# Patient Record
Sex: MALE | URBAN - METROPOLITAN AREA
[De-identification: names, ages, dates, MRNs, and addresses within clinical notes are randomized per-mention and may not be internally consistent; named-entity substitution may affect disease eponyms.]

---

## 2021-01-06 ENCOUNTER — INPATIENT (INPATIENT)
Facility: HOSPITAL | Age: 56
LOS: 2 days | Discharge: HOME | DRG: 392 | End: 2021-01-09
Attending: INTERNAL MEDICINE | Admitting: HOSPITALIST
Payer: MEDICAID

## 2021-01-06 VITALS
RESPIRATION RATE: 18 BRPM | SYSTOLIC BLOOD PRESSURE: 112 MMHG | TEMPERATURE: 98 F | HEART RATE: 74 BPM | DIASTOLIC BLOOD PRESSURE: 64 MMHG | WEIGHT: 231.49 LBS | OXYGEN SATURATION: 94 %

## 2021-01-06 LAB
ALBUMIN SERPL ELPH-MCNC: 4.7 G/DL — SIGNIFICANT CHANGE UP (ref 3.3–5)
ALP SERPL-CCNC: 42 U/L — SIGNIFICANT CHANGE UP (ref 40–120)
ALT FLD-CCNC: 18 U/L — SIGNIFICANT CHANGE UP (ref 10–45)
ANION GAP SERPL CALC-SCNC: 13 MMOL/L — SIGNIFICANT CHANGE UP (ref 5–17)
AST SERPL-CCNC: 13 U/L — SIGNIFICANT CHANGE UP (ref 10–40)
BASE EXCESS BLDV CALC-SCNC: 1 MMOL/L — SIGNIFICANT CHANGE UP (ref -2–2)
BASOPHILS # BLD AUTO: 0.1 K/UL — SIGNIFICANT CHANGE UP (ref 0–0.2)
BASOPHILS NFR BLD AUTO: 0.7 % — SIGNIFICANT CHANGE UP (ref 0–2)
BILIRUB SERPL-MCNC: 0.7 MG/DL — SIGNIFICANT CHANGE UP (ref 0.2–1.2)
BUN SERPL-MCNC: 16 MG/DL — SIGNIFICANT CHANGE UP (ref 7–23)
CA-I SERPL-SCNC: 1.15 MMOL/L — SIGNIFICANT CHANGE UP (ref 1.12–1.3)
CALCIUM SERPL-MCNC: 9.9 MG/DL — SIGNIFICANT CHANGE UP (ref 8.4–10.5)
CHLORIDE BLDV-SCNC: 107 MMOL/L — SIGNIFICANT CHANGE UP (ref 96–108)
CHLORIDE SERPL-SCNC: 99 MMOL/L — SIGNIFICANT CHANGE UP (ref 96–108)
CO2 BLDV-SCNC: 29 MMOL/L — SIGNIFICANT CHANGE UP (ref 22–30)
CO2 SERPL-SCNC: 24 MMOL/L — SIGNIFICANT CHANGE UP (ref 22–31)
CREAT SERPL-MCNC: 0.97 MG/DL — SIGNIFICANT CHANGE UP (ref 0.5–1.3)
EOSINOPHIL # BLD AUTO: 1.04 K/UL — HIGH (ref 0–0.5)
EOSINOPHIL NFR BLD AUTO: 7.3 % — HIGH (ref 0–6)
GAS PNL BLDV: 131 MMOL/L — LOW (ref 135–145)
GAS PNL BLDV: SIGNIFICANT CHANGE UP
GAS PNL BLDV: SIGNIFICANT CHANGE UP
GLUCOSE BLDV-MCNC: 130 MG/DL — HIGH (ref 70–99)
GLUCOSE SERPL-MCNC: 128 MG/DL — HIGH (ref 70–99)
HCO3 BLDV-SCNC: 27 MMOL/L — SIGNIFICANT CHANGE UP (ref 21–29)
HCT VFR BLD CALC: 48.9 % — SIGNIFICANT CHANGE UP (ref 39–50)
HCT VFR BLDA CALC: 50 % — SIGNIFICANT CHANGE UP (ref 39–50)
HGB BLD CALC-MCNC: 16.3 G/DL — SIGNIFICANT CHANGE UP (ref 13–17)
HGB BLD-MCNC: 15.7 G/DL — SIGNIFICANT CHANGE UP (ref 13–17)
IMM GRANULOCYTES NFR BLD AUTO: 0.3 % — SIGNIFICANT CHANGE UP (ref 0–1.5)
LACTATE BLDV-MCNC: 1.1 MMOL/L — SIGNIFICANT CHANGE UP (ref 0.7–2)
LIDOCAIN IGE QN: 168 U/L — HIGH (ref 7–60)
LYMPHOCYTES # BLD AUTO: 24.2 % — SIGNIFICANT CHANGE UP (ref 13–44)
LYMPHOCYTES # BLD AUTO: 3.44 K/UL — HIGH (ref 1–3.3)
MCHC RBC-ENTMCNC: 27.7 PG — SIGNIFICANT CHANGE UP (ref 27–34)
MCHC RBC-ENTMCNC: 32.1 GM/DL — SIGNIFICANT CHANGE UP (ref 32–36)
MCV RBC AUTO: 86.4 FL — SIGNIFICANT CHANGE UP (ref 80–100)
MONOCYTES # BLD AUTO: 0.71 K/UL — SIGNIFICANT CHANGE UP (ref 0–0.9)
MONOCYTES NFR BLD AUTO: 5 % — SIGNIFICANT CHANGE UP (ref 2–14)
NEUTROPHILS # BLD AUTO: 8.87 K/UL — HIGH (ref 1.8–7.4)
NEUTROPHILS NFR BLD AUTO: 62.5 % — SIGNIFICANT CHANGE UP (ref 43–77)
NRBC # BLD: 0 /100 WBCS — SIGNIFICANT CHANGE UP (ref 0–0)
PCO2 BLDV: 52 MMHG — HIGH (ref 35–50)
PH BLDV: 7.34 — LOW (ref 7.35–7.45)
PLATELET # BLD AUTO: 247 K/UL — SIGNIFICANT CHANGE UP (ref 150–400)
PO2 BLDV: 20 MMHG — LOW (ref 25–45)
POTASSIUM BLDV-SCNC: 3.5 MMOL/L — SIGNIFICANT CHANGE UP (ref 3.5–5.3)
POTASSIUM SERPL-MCNC: 4.1 MMOL/L — SIGNIFICANT CHANGE UP (ref 3.5–5.3)
POTASSIUM SERPL-SCNC: 4.1 MMOL/L — SIGNIFICANT CHANGE UP (ref 3.5–5.3)
PROT SERPL-MCNC: 7.1 G/DL — SIGNIFICANT CHANGE UP (ref 6–8.3)
RBC # BLD: 5.66 M/UL — SIGNIFICANT CHANGE UP (ref 4.2–5.8)
RBC # FLD: 12.6 % — SIGNIFICANT CHANGE UP (ref 10.3–14.5)
SAO2 % BLDV: 27 % — LOW (ref 67–88)
SODIUM SERPL-SCNC: 136 MMOL/L — SIGNIFICANT CHANGE UP (ref 135–145)
TROPONIN T, HIGH SENSITIVITY RESULT: <6 NG/L — SIGNIFICANT CHANGE UP (ref 0–51)
WBC # BLD: 14.2 K/UL — HIGH (ref 3.8–10.5)
WBC # FLD AUTO: 14.2 K/UL — HIGH (ref 3.8–10.5)

## 2021-01-06 PROCEDURE — 99285 EMERGENCY DEPT VISIT HI MDM: CPT

## 2021-01-06 PROCEDURE — 71275 CT ANGIOGRAPHY CHEST: CPT | Mod: 26

## 2021-01-06 PROCEDURE — 93010 ELECTROCARDIOGRAM REPORT: CPT

## 2021-01-06 PROCEDURE — 74174 CTA ABD&PLVS W/CONTRAST: CPT | Mod: 26

## 2021-01-06 RX ORDER — MORPHINE SULFATE 50 MG/1
4 CAPSULE, EXTENDED RELEASE ORAL ONCE
Refills: 0 | Status: DISCONTINUED | OUTPATIENT
Start: 2021-01-06 | End: 2021-01-06

## 2021-01-06 RX ORDER — SUCRALFATE 1 G
1 TABLET ORAL ONCE
Refills: 0 | Status: COMPLETED | OUTPATIENT
Start: 2021-01-06 | End: 2021-01-06

## 2021-01-06 RX ADMIN — Medication 1 GRAM(S): at 22:43

## 2021-01-06 RX ADMIN — Medication 30 MILLILITER(S): at 22:43

## 2021-01-06 RX ADMIN — MORPHINE SULFATE 4 MILLIGRAM(S): 50 CAPSULE, EXTENDED RELEASE ORAL at 22:14

## 2021-01-06 NOTE — ED PROVIDER NOTE - ATTENDING CONTRIBUTION TO CARE
Attending MD Gomez: I personally have seen and examined this patient.  Resident note reviewed and agree on plan of care and except where noted.  See below for details.     Seen in [facility]    55M with no reported PMH/PSH/Meds/Allergies presents to the ED with epigastric pain    TO BE COMPLETED Attending MD Gomez: I personally have seen and examined this patient.  Resident note reviewed and agree on plan of care and except where noted.  See below for details.     Patient brought in after being evaluated by [facility] with epigastric pain radiating to back, diaphoresis and severe pain.  Patient brought emergently to CT scanner for concern for aortic dissection.  No gross dissection seen while patient on scanner.  Rads called to request a prompt review and read.  Will await.     Seen in [facility],  [place], [id number]     55M with no reported PMH/PSH/Meds/Allergies presents to the ED with epigastric pain.  Reports pain started about 2 hrs after returning from work.  Reports has not had a meal today, reports had a [employer] after work.  Reports pain is in the epigastrium, reports radiation to lower abdomen.  Reports constant, severe pain.  Denies emesis, diarrhea, black or bloody stools.  Denies dysuria, hematuria, change in urinary habits including frequency, urgency. Denies testicular pain.  Reports chest pain. Denies shortness of breath.  Denies cough, COVID/COVID contacts, sick contacts.  Denies fevers, chills, dizziness, weakness.  Denies previous episode. A ten (10) point review of systems was negative other than as stated in the HPI or elsewhere in the chart.     Exam:   General: +uncomfortable, diaphoretic, once back in the room after CT scan  HENT: head NCAT, airway patent with moist mucous membranes  Eyes: PERRL  Lungs: lungs CTAB with good inspiratory effort, no wheezing, no rhonchi, no rales  Cardiac: +S1S2, no m/r/g  GI: abdomen soft with +BS, +epigastric and RUQ tenderness, no rebound, no guarding, ND  : no CVAT  MSK: FROM at neck, no calf tenderness, swelling, erythema or warmth  Neuro: moving all extremities with 5/5 strength bilateral upper and lower extremities, sensory grossly intact, no gross neuro deficits  Psych: normal mood and affect   Skin: no rash noted    A/P: 55M with epigastric pain, CTA C/A/P performed for possible aortic dissection, awaiting prelim read, DDx includes pancreatitis, cholecystitis, choledocholithiasis, gastritis, will give pain control, labs, GI cocktail, otherwise npo, CTA official read, reassess Attending MD Gomez: I personally have seen and examined this patient.  Resident note reviewed and agree on plan of care and except where noted.  See below for details.     Patient brought in after being evaluated by [facility] with epigastric pain radiating to back, diaphoresis and severe pain.  Patient brought emergently to CT scanner for concern for aortic dissection.  No gross dissection seen while patient on scanner.  Rads called to request a prompt review and read.  Will await.     Seen in [facility],  [place], [id number]     55M with no reported PMH/PSH/Meds/Allergies (reports has not seen a doctor in at least three years) presents to the ED with epigastric pain.  Reports pain started about 2 hrs after returning from work.  Reports has not had a meal today, reports had a [employer] after work.  Reports pain is in the epigastrium, reports radiation to lower abdomen.  Reports constant, severe pain.  Denies emesis, diarrhea, black or bloody stools.  Denies dysuria, hematuria, change in urinary habits including frequency, urgency. Denies testicular pain.  Reports chest pain. Denies shortness of breath.  Denies cough, COVID/COVID contacts, sick contacts.  Denies fevers, chills, dizziness, weakness.  Denies previous episode. A ten (10) point review of systems was negative other than as stated in the HPI or elsewhere in the chart.     Exam:   General: +uncomfortable, diaphoretic, once back in the room after CT scan  HENT: head NCAT, airway patent with moist mucous membranes  Eyes: PERRL  Lungs: lungs CTAB with good inspiratory effort, no wheezing, no rhonchi, no rales  Cardiac: +S1S2, no m/r/g  GI: abdomen soft with +BS, +epigastric and RUQ tenderness, no rebound, no guarding, ND  : no CVAT  MSK: FROM at neck, no calf tenderness, swelling, erythema or warmth  Neuro: moving all extremities with 5/5 strength bilateral upper and lower extremities, sensory grossly intact, no gross neuro deficits  Psych: normal mood and affect   Skin: no rash noted    A/P: 55M with epigastric pain, CTA C/A/P performed for possible aortic dissection, awaiting prelim read, DDx includes pancreatitis, cholecystitis, choledocholithiasis, gastritis, will give pain control, labs, GI cocktail, otherwise npo, CTA official read, reassess

## 2021-01-06 NOTE — ED ADULT NURSE NOTE - CHIEF COMPLAINT QUOTE
Mid epigastric abdominal pain radiating downwards x 1 hour, diaphoretic in triage. No medical hx or allergies, has not seen doctor in 3 years.

## 2021-01-06 NOTE — ED PROVIDER NOTE - PHYSICAL EXAMINATION
Physical Exam:  General: In obvious pain  Eyes: EOMI, Conjunctia and sclera clear  Neck: No JVD  Lungs: Clear to auscultation bilaterally, no wheeze, no rhonchi  Heart: Normal S1, S2, no murmurs  Abdomen: mildly distended, tender to palpation over epigastric, RUQ,  Soft, nontender, nondistended, no CVA tenderness  Extremities: 2+ peripheral pulses, no edema  Psych: AAO X3  Neurologic: Non-focal, moving X4 extremities, no change in sensation

## 2021-01-06 NOTE — ED PROVIDER NOTE - OBJECTIVE STATEMENT
55 Y M no PMH, no PMD in years presenting with acute onset epigastric pain earlier today. States after returning from work ~2 hours prior to arrival experienced acute onset epigastric pain, associated with belching, diaphoresis, radiating to lower abdomen and groin, worse with inspiration, no PO since this morning. + smoking, no EtOH, no sympathomimetic, methamphetamine or other recreational drug use, no Fhx cardiac mortality, Denies any emesis, back pain, fever, hematuria, dysuria, diarrhea.

## 2021-01-06 NOTE — ED ADULT NURSE NOTE - NSIMPLEMENTINTERV_GEN_ALL_ED
Implemented All Universal Safety Interventions:  [place] to call system. Call bell, personal items and telephone within reach. Instruct patient to call for assistance. Room bathroom lighting operational. Non-slip footwear when patient is off stretcher. Physically safe environment: no spills, clutter or unnecessary equipment. Stretcher in lowest position, wheels locked, appropriate side rails in place.

## 2021-01-06 NOTE — ED ADULT TRIAGE NOTE - CHIEF COMPLAINT QUOTE
abdominal pain, no radiation. No medical hx of allergies according to son. Mid epigastric abdominal pain radiating downwards x 1 hour, diaphoretic in triage. No medical hx or allergies, has not seen doctor in 3 years.

## 2021-01-06 NOTE — ED ADULT NURSE NOTE - OBJECTIVE STATEMENT
2211 pt 55ym aox4 Czech spkg, c/o epigastric pain x 1hr today, able to verbalize concerns, pt sent to ct/labs/test pending

## 2021-01-06 NOTE — ED PROVIDER NOTE - NS ED ROS FT
GENERAL: No fever or chills, + diaphoresis  EYES: No change in vision  HEENT: No trouble swallowing or speaking  CARDIAC: + chest pain  PULMONARY: No cough or SOB  GI: + abdominal pain, no nausea or no vomiting, no diarrhea or constipation, + belching  : No changes in urination  SKIN: No rashes  NEURO: No headache, no numbness, no new weakness or change in sensation of B/L upper and lower extremities  MSK: No joint pain  Otherwise as HPI or negative.

## 2021-01-06 NOTE — ED PROVIDER NOTE - CLINICAL SUMMARY MEDICAL DECISION MAKING FREE TEXT BOX
55 Y M no PMD, no PMH presenting with acute onset upper abdominal pain with radiation to lower abdomen. Concern for aortic dissection in setting of acute onset abdominal pain, Cholecystitis, pancreatitis, gastritis. Common labs, CTAP, RUQ ultrasound, Pain control.

## 2021-01-06 NOTE — ED PROVIDER NOTE - RAPID ASSESSMENT
55 y.o M with no known PMHx presents with 8/10 epigastric pain radiating down his groin x1 hour. +diaphoresis, and nausea. Denies emesis, back pain, fever, COVID exposure, or hematuria. Of note, pt has not seen PMD for past 3 years.     Scribe Statement: I, Piedad Orellana, attest that this documentation has been prepared under the direction and in the presence of Hernesto Kohler)   Hernesto Kohler (MD) Patient was rapidly assessed via a telemedicine and/or role of Quick Triage Doctor; a limited history, physical exam and assessment was performed. The patient will be seen and further evaluated in the main emergency department. The remainder of care and evaluation will be conducted by the primary emergency medicine team. Receiving team will follow up on labs, imaging and serially reassess patient as indicated. All further decisions regarding patient care, evaluation and disposition are at the discretion of the receiving primary emergency department team.   Hernesto Kohler) I personally performed the service described in the documentation recorded by the scribe in my presence, and it accurately and completely records my words and actions. 55 y.o M with no known PMHx presents with 8/10 epigastric pain radiating down his groin x1 hour. +diaphoresis, and nausea. Denies emesis, back pain, fever, COVID exposure, or hematuria. Of note, pt has not seen PMD for past 3 years.  CTA- C/A/P ordered to eval for aortic catastrophe and pt moved to main ED.  S/O given to primary team.  --BMM    Scribe Statement: I, Piedad Orellana, attest that this documentation has been prepared under the direction and in the presence of Hernesto Kohler)   Hernesto Kohler (MD) Patient was rapidly assessed via a telemedicine and/or role of Quick Triage Doctor; a limited history, physical exam and assessment was performed. The patient will be seen and further evaluated in the main emergency department. The remainder of care and evaluation will be conducted by the primary emergency medicine team. Receiving team will follow up on labs, imaging and serially reassess patient as indicated. All further decisions regarding patient care, evaluation and disposition are at the discretion of the receiving primary emergency department team.   Hernesto Kohler (MD) I personally performed the service described in the documentation recorded by the scribe in my presence, and it accurately and completely records my words and actions.

## 2021-01-07 DIAGNOSIS — R10.9 UNSPECIFIED ABDOMINAL PAIN: ICD-10-CM

## 2021-01-07 DIAGNOSIS — R74.8 ABNORMAL LEVELS OF OTHER SERUM ENZYMES: ICD-10-CM

## 2021-01-07 DIAGNOSIS — D72.829 ELEVATED WHITE BLOOD CELL COUNT, UNSPECIFIED: ICD-10-CM

## 2021-01-07 DIAGNOSIS — Z29.9 ENCOUNTER FOR PROPHYLACTIC MEASURES, UNSPECIFIED: ICD-10-CM

## 2021-01-07 LAB — SARS-COV-2 RNA SPEC QL NAA+PROBE: SIGNIFICANT CHANGE UP

## 2021-01-07 PROCEDURE — 99223 1ST HOSP IP/OBS HIGH 75: CPT | Mod: 25

## 2021-01-07 PROCEDURE — 88305 TISSUE EXAM BY PATHOLOGIST: CPT | Mod: 26

## 2021-01-07 PROCEDURE — 88312 SPECIAL STAINS GROUP 1: CPT | Mod: 26

## 2021-01-07 PROCEDURE — 99223 1ST HOSP IP/OBS HIGH 75: CPT | Mod: GC

## 2021-01-07 PROCEDURE — 43239 EGD BIOPSY SINGLE/MULTIPLE: CPT

## 2021-01-07 PROCEDURE — 76700 US EXAM ABDOM COMPLETE: CPT | Mod: 26

## 2021-01-07 RX ORDER — SODIUM CHLORIDE 9 MG/ML
1000 INJECTION INTRAMUSCULAR; INTRAVENOUS; SUBCUTANEOUS
Refills: 0 | Status: DISCONTINUED | OUTPATIENT
Start: 2021-01-07 | End: 2021-01-09

## 2021-01-07 RX ORDER — ACETAMINOPHEN 500 MG
650 TABLET ORAL EVERY 6 HOURS
Refills: 0 | Status: DISCONTINUED | OUTPATIENT
Start: 2021-01-07 | End: 2021-01-08

## 2021-01-07 RX ORDER — PANTOPRAZOLE SODIUM 20 MG/1
40 TABLET, DELAYED RELEASE ORAL
Refills: 0 | Status: DISCONTINUED | OUTPATIENT
Start: 2021-01-07 | End: 2021-01-09

## 2021-01-07 RX ORDER — SODIUM CHLORIDE 9 MG/ML
1000 INJECTION, SOLUTION INTRAVENOUS ONCE
Refills: 0 | Status: COMPLETED | OUTPATIENT
Start: 2021-01-07 | End: 2021-01-07

## 2021-01-07 RX ORDER — MORPHINE SULFATE 50 MG/1
4 CAPSULE, EXTENDED RELEASE ORAL ONCE
Refills: 0 | Status: DISCONTINUED | OUTPATIENT
Start: 2021-01-07 | End: 2021-01-07

## 2021-01-07 RX ORDER — SUCRALFATE 1 G
1 TABLET ORAL
Refills: 0 | Status: DISCONTINUED | OUTPATIENT
Start: 2021-01-07 | End: 2021-01-09

## 2021-01-07 RX ORDER — MORPHINE SULFATE 50 MG/1
2 CAPSULE, EXTENDED RELEASE ORAL EVERY 6 HOURS
Refills: 0 | Status: DISCONTINUED | OUTPATIENT
Start: 2021-01-07 | End: 2021-01-08

## 2021-01-07 RX ADMIN — SODIUM CHLORIDE 30 MILLILITER(S): 9 INJECTION INTRAMUSCULAR; INTRAVENOUS; SUBCUTANEOUS at 23:38

## 2021-01-07 RX ADMIN — SODIUM CHLORIDE 1000 MILLILITER(S): 9 INJECTION, SOLUTION INTRAVENOUS at 05:36

## 2021-01-07 RX ADMIN — MORPHINE SULFATE 2 MILLIGRAM(S): 50 CAPSULE, EXTENDED RELEASE ORAL at 23:40

## 2021-01-07 RX ADMIN — SODIUM CHLORIDE 30 MILLILITER(S): 9 INJECTION INTRAMUSCULAR; INTRAVENOUS; SUBCUTANEOUS at 17:24

## 2021-01-07 RX ADMIN — MORPHINE SULFATE 2 MILLIGRAM(S): 50 CAPSULE, EXTENDED RELEASE ORAL at 17:24

## 2021-01-07 RX ADMIN — Medication 1 GRAM(S): at 18:15

## 2021-01-07 RX ADMIN — MORPHINE SULFATE 4 MILLIGRAM(S): 50 CAPSULE, EXTENDED RELEASE ORAL at 02:33

## 2021-01-07 RX ADMIN — PANTOPRAZOLE SODIUM 40 MILLIGRAM(S): 20 TABLET, DELAYED RELEASE ORAL at 17:24

## 2021-01-07 RX ADMIN — Medication 1 GRAM(S): at 23:38

## 2021-01-07 RX ADMIN — MORPHINE SULFATE 4 MILLIGRAM(S): 50 CAPSULE, EXTENDED RELEASE ORAL at 00:34

## 2021-01-07 NOTE — CONSULT NOTE ADULT - PROBLEM SELECTOR RECOMMENDATION 9
Pt p/w acute 10/10 epigastric pain. Also reporting increased acid reflux. Found to have elevated WBC to 14.20, w/ eosinophilia and elevated lipase. Imaging findings showing gastritis w/ reactive gallbladder wall thickening and cholelithiasis on abdominal U/S, though negative sonographic Cameron's. Pt w/ significant risk factors including smoking history and excessive consumption of coffee. At this point, differential diagnosis is broad and includes, but is not limited to, peptic ulcer disease, gastritis, possibly related to underlying infection, and neoplasm.  - Cont w/ protonix 40 mg tablets BID  - Pt would benefit from EGD. D/w him, and he is in agreement. Added to the schedule for today. Pt p/w acute 10/10 epigastric pain. Also reporting increased acid reflux. Found to have elevated WBC to 14.20, w/ eosinophilia and elevated lipase. Imaging findings showing gastritis w/ reactive gallbladder wall thickening and cholelithiasis on abdominal U/S, though negative sonographic Cameron's. Pt w/ significant risk factors including smoking history and excessive consumption of coffee. At this point, differential diagnosis is broad and includes, but is not limited to, peptic ulcer disease, gastritis, possibly related to underlying infection, and neoplasm.  - Cont w/ protonix 40 mg tablets BID  - C/w PRN maalox for dyspepsia  - Pt would benefit from EGD. D/w him, and he is in agreement. Added to the schedule for today. Pt p/w acute 10/10 epigastric pain. Also reporting increased acid reflux. Found to have elevated WBC to 14.20, w/ eosinophilia and elevated lipase. Imaging findings showing gastritis w/ reactive gallbladder wall thickening and cholelithiasis on abdominal U/S, though negative sonographic Cameron's. Pt w/ significant risk factors including smoking history and excessive consumption of coffee. At this point, differential diagnosis is broad and includes, but is not limited to, peptic ulcer disease, gastritis, possibly related to underlying infection, and neoplasm.  - Cont w/ protonix 40 mg tablets BID  - C/w PRN maalox for dyspepsia  - Pt would benefit from EGD. D/w him, and he is in agreement. Added to the schedule for today.  - Maintain NPO until after scope.  - Pain management per primary team.

## 2021-01-07 NOTE — CONSULT NOTE ADULT - SUBJECTIVE AND OBJECTIVE BOX
NOTE INCOMPLETE/ IN PROGRESS  *Please wait for attending attestation for official recommendations.*    Gastroenterology     HPI:  55 year old man without PMH (do not see outpatient doctor) presents with acute onset epigastric pain. He reports that for the past 2-3 days he has had decreased appetite without associated nausea or vomiting or early satiety. His PO intake has been drastically reduced 2/2 decreased appetite and he was unable to eat anything yesterday. In the evening yesterday the patient had acute onset 10/10 epigastric abdominal pain that has persisted through the night. Reportsdrinking 5-6 cups of coffee every single day.    Denies prior h/o EGD nor colonoscopy. NO personal nor family family of malignancy. Denies CP, SOB, fever, chill, n/v/c/d, nor urinary concerns.    ED:  Vitals stable  WBC 14, and lipase 168.  s/p 1L LR, Morphine 4mg x 3, and Sucralfate.  CT demonstrated gastritis with reactive gall bladder wall thickening and a normal pancreas/gall bladder. RUQ US showed one gall stone, 1cm. Negative Cameron. s/p surgery consult, no acute intervention needed, admitted to medicine for further management.  (07 Jan 2021 08:30)      PAST MEDICAL & SURGICAL HISTORY:  No pertinent past medical history    No significant past surgical history        REVIEW OF SYSTEMS      General:	    Skin/Breast:  	  Ophthalmologic:  	  ENMT:	    Respiratory and Thorax:  	  Cardiovascular:	    Gastrointestinal:	    Genitourinary:	    Musculoskeletal:	    Neurological:	    Psychiatric:	    Hematology/Lymphatics:	    Endocrine:	    Allergic/Immunologic:	    MEDICATIONS  (STANDING):    MEDICATIONS  (PRN):  aluminum hydroxide/magnesium hydroxide/simethicone Suspension 30 milliLiter(s) Oral every 4 hours PRN Dyspepsia      Allergies    No Known Allergies    Intolerances        SOCIAL HISTORY:    FAMILY HISTORY:  No pertinent family history in first degree relatives        Vital Signs Last 24 Hrs  T(C): 37.2 (07 Jan 2021 09:16), Max: 37.2 (07 Jan 2021 09:16)  T(F): 99 (07 Jan 2021 09:16), Max: 99 (07 Jan 2021 09:16)  HR: 95 (07 Jan 2021 09:16) (74 - 95)  BP: 118/68 (07 Jan 2021 09:16) (100/61 - 142/86)  BP(mean): --  RR: 18 (07 Jan 2021 09:16) (17 - 20)  SpO2: 95% (07 Jan 2021 09:16) (94% - 99%)    PHYSICAL EXAM:      Constitutional:    Eyes:    ENMT:    Neck:    Breasts:    Back:    Respiratory:    Cardiovascular:    Gastrointestinal:    Genitourinary:    Rectal:    Extremities:    Vascular:    Neurological:    Skin:    Lymph Nodes:    Musculoskeletal:    Psychiatric:        LABS:                        15.7   14.20 )-----------( 247      ( 06 Jan 2021 21:56 )             48.9       Hemoglobin: 15.7 g/dL (01-06-21 @ 21:56)      01-06    136  |  99  |  16  ----------------------------<  128<H>  4.1   |  24  |  0.97    Ca    9.9      06 Jan 2021 21:56    TPro  7.1  /  Alb  4.7  /  TBili  0.7  /  DBili  x   /  AST  13  /  ALT  18  /  AlkPhos  42  01-06          RADIOLOGY & ADDITIONAL STUDIES:    ENDOSCOPIES:    NOTE INCOMPLETE/ IN PROGRESS  *Please wait for attending attestation for official recommendations.*    Gastroenterology     HPI:  55 year old man without PMH (do not see outpatient doctor) presents with acute onset epigastric pain. He reports that for the past 2-3 days he has had decreased appetite without associated nausea or vomiting or early satiety. His PO intake has been drastically reduced 2/2 decreased appetite and he was unable to eat anything yesterday. In the evening yesterday the patient had acute onset 10/10 epigastric abdominal pain that has persisted through the night. Reportsdrinking 5-6 cups of coffee every single day.    Denies prior h/o EGD nor colonoscopy. NO personal nor family family of malignancy. Denies CP, SOB, fever, chill, n/v/c/d, nor urinary concerns.    ED:  Vitals stable  WBC 14, and lipase 168.  s/p 1L LR, Morphine 4mg x 3, and Sucralfate.  CT demonstrated gastritis with reactive gall bladder wall thickening and a normal pancreas/gall bladder. RUQ US showed one gall stone, 1cm. Negative Cameron. s/p surgery consult, no acute intervention needed, admitted to medicine for further management.  (07 Jan 2021 08:30)      PAST MEDICAL & SURGICAL HISTORY:  No pertinent past medical history    No significant past surgical history        REVIEW OF SYSTEMS      General:	    Skin/Breast:  	  Ophthalmologic:  	  ENMT:	    Respiratory and Thorax:  	  Cardiovascular:	    Gastrointestinal:	    Genitourinary:	    Musculoskeletal:	    Neurological:	    Psychiatric:	    Hematology/Lymphatics:	    Endocrine:	    Allergic/Immunologic:	    MEDICATIONS  (STANDING):    MEDICATIONS  (PRN):  aluminum hydroxide/magnesium hydroxide/simethicone Suspension 30 milliLiter(s) Oral every 4 hours PRN Dyspepsia      Allergies    No Known Allergies    Intolerances        SOCIAL HISTORY:    FAMILY HISTORY:  No pertinent family history in first degree relatives        Vital Signs Last 24 Hrs  T(C): 37.2 (07 Jan 2021 09:16), Max: 37.2 (07 Jan 2021 09:16)  T(F): 99 (07 Jan 2021 09:16), Max: 99 (07 Jan 2021 09:16)  HR: 95 (07 Jan 2021 09:16) (74 - 95)  BP: 118/68 (07 Jan 2021 09:16) (100/61 - 142/86)  BP(mean): --  RR: 18 (07 Jan 2021 09:16) (17 - 20)  SpO2: 95% (07 Jan 2021 09:16) (94% - 99%)    PHYSICAL EXAM:      Constitutional:    Eyes:    ENMT:    Neck:    Breasts:    Back:    Respiratory:    Cardiovascular:    Gastrointestinal:    Genitourinary:    Rectal:    Extremities:    Vascular:    Neurological:    Skin:    Lymph Nodes:    Musculoskeletal:    Psychiatric:        LABS:                        15.7   14.20 )-----------( 247      ( 06 Jan 2021 21:56 )             48.9       Hemoglobin: 15.7 g/dL (01-06-21 @ 21:56)      01-06    136  |  99  |  16  ----------------------------<  128<H>  4.1   |  24  |  0.97    Ca    9.9      06 Jan 2021 21:56    TPro  7.1  /  Alb  4.7  /  TBili  0.7  /  DBili  x   /  AST  13  /  ALT  18  /  AlkPhos  42  01-06          RADIOLOGY & ADDITIONAL STUDIES:  1) EXAM:  CT ANGIO ABD PELV (W)AW IC                        EXAM:  CT ANGIO CHEST (W)AW IC                          PROCEDURE DATE:  01/06/2021      INTERPRETATION:  CLINICAL INFORMATION: Chest pain, epigastric pain.    COMPARISON: None.    PROCEDURE: CT Angiography of the Chest, Abdomen and Pelvis.  Gated precontrast imaging was performed through the heart followed by gated CT Angiography of the heart with subsequent non-gated arterial phase imaging of the chest, abdomen and pelvis.  Intravenous contrast: 90 ml Omnipaque 350. 10 ml discarded.  Oral contrast: None.  Sagittal and coronal reformats were performed as well as 3D (MIP) reconstructions.    FINDINGS:    CT CHEST:    LUNGS AND LARGE AIRWAYS: Bilateral lower lobe and lingular opacities representing due to interstitial edema, atelectasis, no trapping.  PLEURA: No pleural effusion. No pneumothorax.  VESSELS: No intramural hematoma, penetrating aortic ulcer or aortic dissection.  HEART: Heart size is normal. No pericardial effusion.  MEDIASTINUM AND HENRIETTA: No lymphadenopathy.  CHEST WALL AND LOWER NECK: Within normal limits.    CT ABDOMEN AND PELVIS:    LIVER: Within normal limits.  BILE DUCTS: Normal caliber.  GALLBLADDER: Within normal limits.  SPLEEN: Within normal limits.  PANCREAS: Within normal limits.  ADRENALS: Thickened left adrenal gland.    KIDNEYS/URETERS: Within normal limits.  BLADDER: Within normal limits.  REPRODUCTIVE ORGANS: Prostate is enlarged.    BOWEL: Gastric wall thickening with submucosal edema of the gastric antrum and pylorus with surrounding inflammatory change. Small gas containing focus in the gastric body wall the lesser curvature may reflect a small ulcer (5-313). Associated gallbladder wall thickening is likely reactive.. No bowel obstruction. Appendix is normal. Colonic diverticulosis.  PERITONEUM: No free air.    VESSELS: No penetrating aortic ulcer or aortic dissection. No aneurysm.  RETROPERITONEUM/LYMPH NODES: Within normal limits.  ABDOMINAL WALL: Small to moderate fat-containing left and small right inguinal hernia.  BONES:  Limbus L3 vertebral body. Multilevel thoracic lumbar spondylosis with prominent anterior osteophyte formation.    IMPRESSION:    No aortic dissection or aneurysm.    Findings is consistent with gastritis reactive thickening of the gallbladder wall. Consider upper endoscopy once acute symptoms resolve.    STELLA HEARD M.D., RADIOLOGY RESIDENT  This document has been electronically signed.  ALLYSON LINDA MD; Attending Radiologist  This document has been electronically signed. Jan 7 2021 12:51AM    2) EXAM:  US ABDOMEN COMPLETE                        PROCEDURE DATE:  01/07/2021      INTERPRETATION:  CLINICAL INFORMATION: Abdominal pain. Evaluate for cholecystitis. Patient was not premedicated.    COMPARISON: Same day CT abdomen and pelvis.    TECHNIQUE: Sonography of the abdomen.    FINDINGS:    Liver: Within normal limits.  Bile ducts: Normal caliber. Common bile duct measures 3 mm.  Gallbladder: A 1.0 x 0.6 x 0.3 cm stone. Negative Cameron sign.  Pancreas: Poorly visualized.  Spleen: 10.9 cm. Within normal limits.  Right kidney: 12.2 cm. No hydronephrosis.  Left kidney: 11.4 cm.  No hydronephrosis.  Ascites: None.  Aorta and IVC: Poorly visualized.    IMPRESSION:    Cholelithiasis with a negative sonographic Cameron sign. If there is further clinical concern for acute cholecystitis, a HIDA scan is suggested for further evaluation.    STELLA HEARD M.D., RADIOLOGY RESIDENT  This document has been electronically signed.  SEMAJ KIRKPATRICK MD; Attending Radiologist  This document has been electronically signed. Jan 7 2021  2:35AM NOTE INCOMPLETE/ IN PROGRESS  *Please wait for attending attestation for official recommendations.*    Gastroenterology     Per H&P HPI:  55 year old man without PMH (do not see outpatient doctor) presents with acute onset epigastric pain. He reports that for the past 2-3 days he has had decreased appetite without associated nausea or vomiting or early satiety. His PO intake has been drastically reduced 2/2 decreased appetite and he was unable to eat anything yesterday. In the evening yesterday the patient had acute onset 10/10 epigastric abdominal pain that has persisted through the night. Reports drinking 5-6 cups of coffee every single day.    Denies prior h/o EGD nor colonoscopy. NO personal nor family family of malignancy. Denies CP, SOB, fever, chill, n/v/c/d, nor urinary concerns.    GI was consulted given pt's profound abdominal pain and after imaging showed gastric wall thickening w/ likely reactive thickening of the gallbladder wall as well as cholelithiasis on Abd U/S. Spoke with the patient using [employer], [employer]  Andriy, #[id number].  History as per HPI w/ pt additionally reporting that he has never experienced any pain like this before. Pt unable to identify any precipitating factors. Stated that it is not unusual for him to skip meals, and that he did not eat yesterday because of his work schedule. Pt does endorse increased acid reflux in the last couple of weeks.     ED:  Vitals stable  WBC 14, and lipase 168.  s/p 1L LR, Morphine 4mg x 3, and Sucralfate.  CT demonstrated gastritis with reactive gall bladder wall thickening and a normal pancreas/gall bladder. RUQ US showed one gall stone, 1cm. Negative Cameron. s/p surgery consult, no acute intervention needed, admitted to medicine for further management.  (07 Jan 2021 08:30)      PAST MEDICAL & SURGICAL HISTORY:  No pertinent past medical history    No significant past surgical history        REVIEW OF SYSTEMS      General:	    Skin/Breast:  	  Ophthalmologic:  	  ENMT:	    Respiratory and Thorax:  	  Cardiovascular:	    Gastrointestinal:	    Genitourinary:	    Musculoskeletal:	    Neurological:	    Psychiatric:	    Hematology/Lymphatics:	    Endocrine:	    Allergic/Immunologic:	    MEDICATIONS  (STANDING):    MEDICATIONS  (PRN):  aluminum hydroxide/magnesium hydroxide/simethicone Suspension 30 milliLiter(s) Oral every 4 hours PRN Dyspepsia      Allergies    No Known Allergies    Intolerances        SOCIAL HISTORY:    FAMILY HISTORY:  No pertinent family history in first degree relatives        Vital Signs Last 24 Hrs  T(C): 37.2 (07 Jan 2021 09:16), Max: 37.2 (07 Jan 2021 09:16)  T(F): 99 (07 Jan 2021 09:16), Max: 99 (07 Jan 2021 09:16)  HR: 95 (07 Jan 2021 09:16) (74 - 95)  BP: 118/68 (07 Jan 2021 09:16) (100/61 - 142/86)  BP(mean): --  RR: 18 (07 Jan 2021 09:16) (17 - 20)  SpO2: 95% (07 Jan 2021 09:16) (94% - 99%)    PHYSICAL EXAM:      Constitutional:    Eyes:    ENMT:    Neck:    Breasts:    Back:    Respiratory:    Cardiovascular:    Gastrointestinal:    Genitourinary:    Rectal:    Extremities:    Vascular:    Neurological:    Skin:    Lymph Nodes:    Musculoskeletal:    Psychiatric:        LABS:                        15.7   14.20 )-----------( 247      ( 06 Jan 2021 21:56 )             48.9       Hemoglobin: 15.7 g/dL (01-06-21 @ 21:56)      01-06    136  |  99  |  16  ----------------------------<  128<H>  4.1   |  24  |  0.97    Ca    9.9      06 Jan 2021 21:56    TPro  7.1  /  Alb  4.7  /  TBili  0.7  /  DBili  x   /  AST  13  /  ALT  18  /  AlkPhos  42  01-06          RADIOLOGY & ADDITIONAL STUDIES:  1) EXAM:  CT ANGIO ABD PELV (W)AW IC                        EXAM:  CT ANGIO CHEST (W)AW IC                          PROCEDURE DATE:  01/06/2021      INTERPRETATION:  CLINICAL INFORMATION: Chest pain, epigastric pain.    COMPARISON: None.    PROCEDURE: CT Angiography of the Chest, Abdomen and Pelvis.  Gated precontrast imaging was performed through the heart followed by gated CT Angiography of the heart with subsequent non-gated arterial phase imaging of the chest, abdomen and pelvis.  Intravenous contrast: 90 ml Omnipaque 350. 10 ml discarded.  Oral contrast: None.  Sagittal and coronal reformats were performed as well as 3D (MIP) reconstructions.    FINDINGS:    CT CHEST:    LUNGS AND LARGE AIRWAYS: Bilateral lower lobe and lingular opacities representing due to interstitial edema, atelectasis, no trapping.  PLEURA: No pleural effusion. No pneumothorax.  VESSELS: No intramural hematoma, penetrating aortic ulcer or aortic dissection.  HEART: Heart size is normal. No pericardial effusion.  MEDIASTINUM AND HENRIETTA: No lymphadenopathy.  CHEST WALL AND LOWER NECK: Within normal limits.    CT ABDOMEN AND PELVIS:    LIVER: Within normal limits.  BILE DUCTS: Normal caliber.  GALLBLADDER: Within normal limits.  SPLEEN: Within normal limits.  PANCREAS: Within normal limits.  ADRENALS: Thickened left adrenal gland.    KIDNEYS/URETERS: Within normal limits.  BLADDER: Within normal limits.  REPRODUCTIVE ORGANS: Prostate is enlarged.    BOWEL: Gastric wall thickening with submucosal edema of the gastric antrum and pylorus with surrounding inflammatory change. Small gas containing focus in the gastric body wall the lesser curvature may reflect a small ulcer (5-313). Associated gallbladder wall thickening is likely reactive.. No bowel obstruction. Appendix is normal. Colonic diverticulosis.  PERITONEUM: No free air.    VESSELS: No penetrating aortic ulcer or aortic dissection. No aneurysm.  RETROPERITONEUM/LYMPH NODES: Within normal limits.  ABDOMINAL WALL: Small to moderate fat-containing left and small right inguinal hernia.  BONES:  Limbus L3 vertebral body. Multilevel thoracic lumbar spondylosis with prominent anterior osteophyte formation.    IMPRESSION:    No aortic dissection or aneurysm.    Findings is consistent with gastritis reactive thickening of the gallbladder wall. Consider upper endoscopy once acute symptoms resolve.    STELLA HEARD M.D., RADIOLOGY RESIDENT  This document has been electronically signed.  ALLYSON LINDA MD; Attending Radiologist  This document has been electronically signed. Jan 7 2021 12:51AM    2) EXAM:  US ABDOMEN COMPLETE                        PROCEDURE DATE:  01/07/2021      INTERPRETATION:  CLINICAL INFORMATION: Abdominal pain. Evaluate for cholecystitis. Patient was not premedicated.    COMPARISON: Same day CT abdomen and pelvis.    TECHNIQUE: Sonography of the abdomen.    FINDINGS:    Liver: Within normal limits.  Bile ducts: Normal caliber. Common bile duct measures 3 mm.  Gallbladder: A 1.0 x 0.6 x 0.3 cm stone. Negative Cameron sign.  Pancreas: Poorly visualized.  Spleen: 10.9 cm. Within normal limits.  Right kidney: 12.2 cm. No hydronephrosis.  Left kidney: 11.4 cm.  No hydronephrosis.  Ascites: None.  Aorta and IVC: Poorly visualized.    IMPRESSION:    Cholelithiasis with a negative sonographic Cameron sign. If there is further clinical concern for acute cholecystitis, a HIDA scan is suggested for further evaluation.    STELLA HEARD M.D., RADIOLOGY RESIDENT  This document has been electronically signed.  SEMAJ KIRKPATRICK MD; Attending Radiologist  This document has been electronically signed. Jan 7 2021  2:35AM NOTE INCOMPLETE/ IN PROGRESS  *Please wait for attending attestation for official recommendations.*    Gastroenterology     Per H&P HPI:  55 year old man without PMH (do not see outpatient doctor) presents with acute onset epigastric pain. He reports that for the past 2-3 days he has had decreased appetite without associated nausea or vomiting or early satiety. His PO intake has been drastically reduced 2/2 decreased appetite and he was unable to eat anything yesterday. In the evening yesterday the patient had acute onset 10/10 epigastric abdominal pain that has persisted through the night. Reports drinking 5-6 cups of coffee every single day.    Denies prior h/o EGD nor colonoscopy. NO personal nor family family of malignancy. Denies CP, SOB, fever, chill, n/v/c/d, nor urinary concerns.    ED:  Vitals stable  WBC 14, and lipase 168.  s/p 1L LR, Morphine 4mg x 3, and Sucralfate.  CT demonstrated gastritis with reactive gall bladder wall thickening and a normal pancreas/gall bladder. RUQ US showed one gall stone, 1cm. Negative Cameron. s/p surgery consult, no acute intervention needed, admitted to medicine for further management.  (07 Jan 2021 08:30)    GI was consulted given pt's profound abdominal pain and after imaging showed gastric wall thickening w/ likely reactive thickening of the gallbladder wall as well as cholelithiasis on Abd U/S. Spoke with the patient using [email or web address], [employer]  Andriy, #[id number].  History as per HPI w/ pt additionally reporting that he has never experienced any pain like this before. Stated that he went home yesterday after work, and went bed, waking up with excruciating abdominal pain localized to the epigastric region. Pt unable to identify any precipitating factors. Stated that it is not unusual for him to skip meals, and that he did not eat yesterday because of his work schedule. Pt does endorse increased acid reflux in the last couple of weeks. In addition to heavy coffee drinking, he also endorses 35 yr hx of smoking about 4-5 cigarettes daily. States that he only drinks a few times a month, 2-3 shots at most when he does drink. Denies any personal or known family history of GERD, ulcers, or GI malignancies. No EGD or colonoscopy in the past. Aside from abdominal pain and increased acid reflux, pt denies any N/V, constipation, diarrhea, melena, hematochezia.       PAST MEDICAL & SURGICAL HISTORY:  No pertinent past medical history  No significant past surgical history      REVIEW OF SYSTEMS  CONSTITUTIONAL: No weakness, fevers or chills  EYES/ENT: No visual changes;  No vertigo or throat pain   NECK: No pain or stiffness  RESPIRATORY: No cough, wheezing, hemoptysis; No shortness of breath  CARDIOVASCULAR: No chest pain or palpitations  GASTROINTESTINAL: (+) epigastric pain, (+) increased reflux. No nausea, vomiting, or hematemesis; No diarrhea or constipation. No melena or hematochezia.  GENITOURINARY: No dysuria, frequency or hematuria  NEUROLOGICAL: No numbness or weakness  SKIN: No itching, rashes      MEDICATIONS  (STANDING):  MEDICATIONS  (PRN):  aluminum hydroxide/magnesium hydroxide/simethicone Suspension 30 milliLiter(s) Oral every 4 hours PRN Dyspepsia      Allergies  No Known Allergies      SOCIAL HISTORY:  Smokes 4-5 cigarettes daily for 35 years and social EtOH a couple of times per month, w/ no more than 2-3 shots on any given occasion. No illicit drug use. Also drinks 7-8 cups of coffee daily.       FAMILY HISTORY:  No pertinent family history in first degree relatives      Vital Signs Last 24 Hrs  T(C): 37.2 (07 Jan 2021 09:16), Max: 37.2 (07 Jan 2021 09:16)  T(F): 99 (07 Jan 2021 09:16), Max: 99 (07 Jan 2021 09:16)  HR: 95 (07 Jan 2021 09:16) (74 - 95)  BP: 118/68 (07 Jan 2021 09:16) (100/61 - 142/86)  BP(mean): --  RR: 18 (07 Jan 2021 09:16) (17 - 20)  SpO2: 95% (07 Jan 2021 09:16) (94% - 99%)    PHYSICAL EXAM:  GENERAL: NAD, lying in stretcher  HEENT:  Atraumatic, Normocephalic, MMM, supple neck w/ no palpable masses  CHEST/LUNG: Clear to auscultation bilaterally; No rales, rhonchi, wheezing, or rubs. Unlabored respirations  HEART: Regular rate and rhythm; No murmurs, rubs, or gallops  ABDOMEN: BSx4; Soft, epigastric TTP, nondistended  EXTREMITIES:  2+ Peripheral Pulses, brisk capillary refill. No clubbing, cyanosis, or edema  NERVOUS SYSTEM:  A&Ox3, no focal deficits         LABS:                        15.7   14.20 )-----------( 247      ( 06 Jan 2021 21:56 )             48.9       Hemoglobin: 15.7 g/dL (01-06-21 @ 21:56)      01-06    136  |  99  |  16  ----------------------------<  128<H>  4.1   |  24  |  0.97    Ca    9.9      06 Jan 2021 21:56    TPro  7.1  /  Alb  4.7  /  TBili  0.7  /  DBili  x   /  AST  13  /  ALT  18  /  AlkPhos  42  01-06          RADIOLOGY & ADDITIONAL STUDIES:  1) EXAM:  CT ANGIO ABD PELV (W)AW IC                        EXAM:  CT ANGIO CHEST (W)AW IC                          PROCEDURE DATE:  01/06/2021      INTERPRETATION:  CLINICAL INFORMATION: Chest pain, epigastric pain.    COMPARISON: None.    PROCEDURE: CT Angiography of the Chest, Abdomen and Pelvis.  Gated precontrast imaging was performed through the heart followed by gated CT Angiography of the heart with subsequent non-gated arterial phase imaging of the chest, abdomen and pelvis.  Intravenous contrast: 90 ml Omnipaque 350. 10 ml discarded.  Oral contrast: None.  Sagittal and coronal reformats were performed as well as 3D (MIP) reconstructions.    FINDINGS:    CT CHEST:    LUNGS AND LARGE AIRWAYS: Bilateral lower lobe and lingular opacities representing due to interstitial edema, atelectasis, no trapping.  PLEURA: No pleural effusion. No pneumothorax.  VESSELS: No intramural hematoma, penetrating aortic ulcer or aortic dissection.  HEART: Heart size is normal. No pericardial effusion.  MEDIASTINUM AND HENRIETTA: No lymphadenopathy.  CHEST WALL AND LOWER NECK: Within normal limits.    CT ABDOMEN AND PELVIS:    LIVER: Within normal limits.  BILE DUCTS: Normal caliber.  GALLBLADDER: Within normal limits.  SPLEEN: Within normal limits.  PANCREAS: Within normal limits.  ADRENALS: Thickened left adrenal gland.    KIDNEYS/URETERS: Within normal limits.  BLADDER: Within normal limits.  REPRODUCTIVE ORGANS: Prostate is enlarged.    BOWEL: Gastric wall thickening with submucosal edema of the gastric antrum and pylorus with surrounding inflammatory change. Small gas containing focus in the gastric body wall the lesser curvature may reflect a small ulcer (5-313). Associated gallbladder wall thickening is likely reactive.. No bowel obstruction. Appendix is normal. Colonic diverticulosis.  PERITONEUM: No free air.    VESSELS: No penetrating aortic ulcer or aortic dissection. No aneurysm.  RETROPERITONEUM/LYMPH NODES: Within normal limits.  ABDOMINAL WALL: Small to moderate fat-containing left and small right inguinal hernia.  BONES:  Limbus L3 vertebral body. Multilevel thoracic lumbar spondylosis with prominent anterior osteophyte formation.    IMPRESSION:    No aortic dissection or aneurysm.    Findings is consistent with gastritis reactive thickening of the gallbladder wall. Consider upper endoscopy once acute symptoms resolve.    STELLA HEARD M.D., RADIOLOGY RESIDENT  This document has been electronically signed.  ALLYSON LINDA MD; Attending Radiologist  This document has been electronically signed. Jan 7 2021 12:51AM    2) EXAM:  US ABDOMEN COMPLETE                        PROCEDURE DATE:  01/07/2021      INTERPRETATION:  CLINICAL INFORMATION: Abdominal pain. Evaluate for cholecystitis. Patient was not premedicated.    COMPARISON: Same day CT abdomen and pelvis.    TECHNIQUE: Sonography of the abdomen.    FINDINGS:    Liver: Within normal limits.  Bile ducts: Normal caliber. Common bile duct measures 3 mm.  Gallbladder: A 1.0 x 0.6 x 0.3 cm stone. Negative Cameron sign.  Pancreas: Poorly visualized.  Spleen: 10.9 cm. Within normal limits.  Right kidney: 12.2 cm. No hydronephrosis.  Left kidney: 11.4 cm.  No hydronephrosis.  Ascites: None.  Aorta and IVC: Poorly visualized.    IMPRESSION:    Cholelithiasis with a negative sonographic Cameron sign. If there is further clinical concern for acute cholecystitis, a HIDA scan is suggested for further evaluation.    STELLA HEARD M.D., RADIOLOGY RESIDENT  This document has been electronically signed.  SEMAJ KIRKPATRICK MD; Attending Radiologist  This document has been electronically signed. Jan 7 2021  2:35AM *Please wait for attending attestation for official recommendations.*    Gastroenterology     Per H&P HPI:  55 year old man without PMH (do not see outpatient doctor) presents with acute onset epigastric pain. He reports that for the past 2-3 days he has had decreased appetite without associated nausea or vomiting or early satiety. His PO intake has been drastically reduced 2/2 decreased appetite and he was unable to eat anything yesterday. In the evening yesterday the patient had acute onset 10/10 epigastric abdominal pain that has persisted through the night. Reports drinking 5-6 cups of coffee every single day.    Denies prior h/o EGD nor colonoscopy. NO personal nor family family of malignancy. Denies CP, SOB, fever, chill, n/v/c/d, nor urinary concerns.    ED:  Vitals stable  WBC 14, and lipase 168.  s/p 1L LR, Morphine 4mg x 3, and Sucralfate.  CT demonstrated gastritis with reactive gall bladder wall thickening and a normal pancreas/gall bladder. RUQ US showed one gall stone, 1cm. Negative Cameron. s/p surgery consult, no acute intervention needed, admitted to medicine for further management.  (07 Jan 2021 08:30)    GI was consulted given pt's profound abdominal pain and after imaging showed gastric wall thickening w/ likely reactive thickening of the gallbladder wall as well as cholelithiasis on Abd U/S. Spoke with the patient using [employer], [place]  [place]riy, #[id number].  History as per HPI w/ pt additionally reporting that he has never experienced any pain like this before. Stated that he went home yesterday after work, and went bed, waking up with excruciating abdominal pain localized to the epigastric region. Pt unable to identify any precipitating factors. Stated that it is not unusual for him to skip meals, and that he did not eat yesterday because of his work schedule. Pt does endorse increased acid reflux in the last couple of weeks. In addition to heavy coffee drinking, he also endorses 35 yr hx of smoking about 4-5 cigarettes daily. States that he only drinks a few times a month, 2-3 shots at most when he does drink. Denies any personal or known family history of GERD, ulcers, or GI malignancies. No EGD or colonoscopy in the past. Aside from abdominal pain and increased acid reflux, pt denies any N/V, constipation, diarrhea, melena, hematochezia.       PAST MEDICAL & SURGICAL HISTORY:  No pertinent past medical history  No significant past surgical history      REVIEW OF SYSTEMS  CONSTITUTIONAL: No weakness, fevers or chills  EYES/ENT: No visual changes;  No vertigo or throat pain   NECK: No pain or stiffness  RESPIRATORY: No cough, wheezing, hemoptysis; No shortness of breath  CARDIOVASCULAR: No chest pain or palpitations  GASTROINTESTINAL: (+) epigastric pain, (+) increased reflux. No nausea, vomiting, or hematemesis; No diarrhea or constipation. No melena or hematochezia.  GENITOURINARY: No dysuria, frequency or hematuria  NEUROLOGICAL: No numbness or weakness  SKIN: No itching, rashes      MEDICATIONS  (STANDING):  MEDICATIONS  (PRN):  aluminum hydroxide/magnesium hydroxide/simethicone Suspension 30 milliLiter(s) Oral every 4 hours PRN Dyspepsia      Allergies  No Known Allergies      SOCIAL HISTORY:  Smokes 4-5 cigarettes daily for 35 years and social EtOH a couple of times per month, w/ no more than 2-3 shots on any given occasion. No illicit drug use. Also drinks 7-8 cups of coffee daily.       FAMILY HISTORY:  No pertinent family history in first degree relatives      Vital Signs Last 24 Hrs  T(C): 37.2 (07 Jan 2021 09:16), Max: 37.2 (07 Jan 2021 09:16)  T(F): 99 (07 Jan 2021 09:16), Max: 99 (07 Jan 2021 09:16)  HR: 95 (07 Jan 2021 09:16) (74 - 95)  BP: 118/68 (07 Jan 2021 09:16) (100/61 - 142/86)  BP(mean): --  RR: 18 (07 Jan 2021 09:16) (17 - 20)  SpO2: 95% (07 Jan 2021 09:16) (94% - 99%)    PHYSICAL EXAM:  GENERAL: NAD, lying in stretcher  HEENT:  Atraumatic, Normocephalic, MMM, supple neck w/ no palpable masses  CHEST/LUNG: Clear to auscultation bilaterally; No rales, rhonchi, wheezing, or rubs. Unlabored respirations  HEART: Regular rate and rhythm; No murmurs, rubs, or gallops  ABDOMEN: BSx4; Soft, epigastric TTP, nondistended  EXTREMITIES:  2+ Peripheral Pulses, brisk capillary refill. No clubbing, cyanosis, or edema  NERVOUS SYSTEM:  A&Ox3, no focal deficits         LABS:                        15.7   14.20 )-----------( 247      ( 06 Jan 2021 21:56 )             48.9       Hemoglobin: 15.7 g/dL (01-06-21 @ 21:56)      01-06    136  |  99  |  16  ----------------------------<  128<H>  4.1   |  24  |  0.97    Ca    9.9      06 Jan 2021 21:56    TPro  7.1  /  Alb  4.7  /  TBili  0.7  /  DBili  x   /  AST  13  /  ALT  18  /  AlkPhos  42  01-06          RADIOLOGY & ADDITIONAL STUDIES:  1) EXAM:  CT ANGIO ABD PELV (W)AW IC                        EXAM:  CT ANGIO CHEST (W)AW IC                          PROCEDURE DATE:  01/06/2021      INTERPRETATION:  CLINICAL INFORMATION: Chest pain, epigastric pain.    COMPARISON: None.    PROCEDURE: CT Angiography of the Chest, Abdomen and Pelvis.  Gated precontrast imaging was performed through the heart followed by gated CT Angiography of the heart with subsequent non-gated arterial phase imaging of the chest, abdomen and pelvis.  Intravenous contrast: 90 ml Omnipaque 350. 10 ml discarded.  Oral contrast: None.  Sagittal and coronal reformats were performed as well as 3D (MIP) reconstructions.    FINDINGS:    CT CHEST:    LUNGS AND LARGE AIRWAYS: Bilateral lower lobe and lingular opacities representing due to interstitial edema, atelectasis, no trapping.  PLEURA: No pleural effusion. No pneumothorax.  VESSELS: No intramural hematoma, penetrating aortic ulcer or aortic dissection.  HEART: Heart size is normal. No pericardial effusion.  MEDIASTINUM AND HENRIETTA: No lymphadenopathy.  CHEST WALL AND LOWER NECK: Within normal limits.    CT ABDOMEN AND PELVIS:    LIVER: Within normal limits.  BILE DUCTS: Normal caliber.  GALLBLADDER: Within normal limits.  SPLEEN: Within normal limits.  PANCREAS: Within normal limits.  ADRENALS: Thickened left adrenal gland.    KIDNEYS/URETERS: Within normal limits.  BLADDER: Within normal limits.  REPRODUCTIVE ORGANS: Prostate is enlarged.    BOWEL: Gastric wall thickening with submucosal edema of the gastric antrum and pylorus with surrounding inflammatory change. Small gas containing focus in the gastric body wall the lesser curvature may reflect a small ulcer ([phone]). Associated gallbladder wall thickening is likely reactive.. No bowel obstruction. Appendix is normal. Colonic diverticulosis.  PERITONEUM: No free air.    VESSELS: No penetrating aortic ulcer or aortic dissection. No aneurysm.  RETROPERITONEUM/LYMPH NODES: Within normal limits.  ABDOMINAL WALL: Small to moderate fat-containing left and small right inguinal hernia.  BONES:  Limbus L3 vertebral body. Multilevel thoracic lumbar spondylosis with prominent anterior osteophyte formation.    IMPRESSION:    No aortic dissection or aneurysm.    Findings is consistent with gastritis reactive thickening of the gallbladder wall. Consider upper endoscopy once acute symptoms resolve.    STELLA HEARD M.D., RADIOLOGY RESIDENT  This document has been electronically signed.  ALLYSON LINDA MD; Attending Radiologist  This document has been electronically signed. Jan 7 2021 12:51AM    2) EXAM:  US ABDOMEN COMPLETE                        PROCEDURE DATE:  01/07/2021      INTERPRETATION:  CLINICAL INFORMATION: Abdominal pain. Evaluate for cholecystitis. Patient was not premedicated.    COMPARISON: Same day CT abdomen and pelvis.    TECHNIQUE: Sonography of the abdomen.    FINDINGS:    Liver: Within normal limits.  Bile ducts: Normal caliber. Common bile duct measures 3 mm.  Gallbladder: A 1.0 x 0.6 x 0.3 cm stone. Negative Cameron sign.  Pancreas: Poorly visualized.  Spleen: 10.9 cm. Within normal limits.  Right kidney: 12.2 cm. No hydronephrosis.  Left kidney: 11.4 cm.  No hydronephrosis.  Ascites: None.  Aorta and IVC: Poorly visualized.    IMPRESSION:    Cholelithiasis with a negative sonographic Cameron sign. If there is further clinical concern for acute cholecystitis, a HIDA scan is suggested for further evaluation.    STELLA HEARD M.D., RADIOLOGY RESIDENT  This document has been electronically signed.  SEMAJ KIRKPATRICK MD; Attending Radiologist  This document has been electronically signed. Jan 7 2021  2:35AM

## 2021-01-07 NOTE — H&P ADULT - NSHPSOCIALHISTORY_GEN_ALL_CORE
Smokes 3-4 cigarettes daily  Social ETOH  No illicit drug use Smokes 3-4 cigarettes daily for 35 years (roughly 6 pack years)   Social ETOH 1 drink per week   No illicit drug use  Drinks 7-8 cups of coffee

## 2021-01-07 NOTE — PRE PROCEDURE NOTE - PRE PROCEDURE EVALUATION
Pre-Endoscopy Evaluation      Referring Physician: Jagjit Taylor MD                                   Procedure: EGD    Indication for Procedure: Abd pain    Pertinent History:    Sedation by Anesthesia [ ]    PAST MEDICAL & SURGICAL HISTORY:  No pertinent past medical history    No significant past surgical history        PMH of Gastroparesis [ ]  Gastric Surgery [ ]  Gastric Outlet Obstruction [ ]    Allergies    No Known Allergies    Intolerances        Latex allergy: [ ] yes [ ] no    Medications:MEDICATIONS  (STANDING):  pantoprazole    Tablet 40 milliGRAM(s) Oral two times a day    MEDICATIONS  (PRN):  acetaminophen   Tablet .. 650 milliGRAM(s) Oral every 6 hours PRN Mild Pain (1 - 3), Moderate Pain (4 - 6)  aluminum hydroxide/magnesium hydroxide/simethicone Suspension 30 milliLiter(s) Oral every 4 hours PRN Dyspepsia  morphine  - Injectable 2 milliGRAM(s) IV Push every 6 hours PRN Severe Pain (7 - 10)      Smoking: [ ] yes  [ ] no    AICD/PPM: [ ] yes   [ ] no    Pertinent lab data:                        15.7   14.20 )-----------( 247      ( 06 Jan 2021 21:56 )             48.9     01-06    136  |  99  |  16  ----------------------------<  128<H>  4.1   |  24  |  0.97    Ca    9.9      06 Jan 2021 21:56    TPro  7.1  /  Alb  4.7  /  TBili  0.7  /  DBili  x   /  AST  13  /  ALT  18  /  AlkPhos  42  01-06                  Physical Examination:  Daily     Daily   Vital Signs Last 24 Hrs  T(C): 37.2 (07 Jan 2021 15:02), Max: 37.4 (07 Jan 2021 11:47)  T(F): 98.9 (07 Jan 2021 15:02), Max: 99.4 (07 Jan 2021 11:47)  HR: 96 (07 Jan 2021 15:02) (74 - 99)  BP: 127/92 (07 Jan 2021 15:02) (100/61 - 142/86)  BP(mean): --  RR: 19 (07 Jan 2021 15:02) (17 - 20)  SpO2: 96% (07 Jan 2021 15:02) (94% - 99%)    BP:                 HR:                  SPO2:               Temperature:    Constitutional: NAD    HEENT: PERRLA, EOMI,       Neck:  No JVD    Respiratory: CTAB/L    Cardiovascular: S1 and S2    Gastrointestinal: BS+, soft, NT/ND    Extremities: No peripheral edema    Neurological: A/O x 3, no focal deficits    Psychiatric: Normal mood, normal affect    : No Andrade    Skin: No rashes    Comments:    ASA Class: I [ ]  II [ ]  III [X ]  IV [ ]    The patient is a suitable candidate for the planned procedure unless box checked [ ]  No, explain:

## 2021-01-07 NOTE — H&P ADULT - NSHPREVIEWOFSYSTEMS_GEN_ALL_CORE
REVIEW OF SYSTEMS:    CONSTITUTIONAL: No weakness, fevers or chills  EYES: No vertigo or throat pain  ENT: No visual changes, eye pain  MOUTH: moist talisha mucosal, no mouth ulcers  NECK: No pain or stiffness  RESPIRATORY: No cough, wheezing, hemoptysis; No shortness of breath  CARDIOVASCULAR: No chest pain or palpitations  GASTROINTESTINAL: + abdominal or epigastric pain. No nausea, vomiting, or hematemesis; No diarrhea or constipation. No melena or hematochezia.  GENITOURINARY: No dysuria, frequency or hematuria  NEUROLOGICAL: No numbness or weakness  SKIN: No itching, rashes REVIEW OF SYSTEMS:  CONSTITUTIONAL: + weakness, No fevers or chills  EYES: No vertigo or throat pain  ENT: No visual changes, eye pain  MOUTH: moist talisha mucosal, no mouth ulcers  NECK: No pain or stiffness  RESPIRATORY: No cough, wheezing, hemoptysis; No shortness of breath  CARDIOVASCULAR: No chest pain or palpitations  GASTROINTESTINAL: + abdominal or epigastric pain. No nausea, vomiting, or hematemesis; No diarrhea or constipation. No melena or hematochezia.  GENITOURINARY: No dysuria, frequency or hematuria  NEUROLOGICAL: No numbness or weakness  SKIN: No itching, rashes

## 2021-01-07 NOTE — H&P ADULT - PROBLEM SELECTOR PLAN 1
The patient presenting w/ acute epigastric pain most likely 2/2 to peptic ulcer or gastritis. Mild leukocytosis and CT findings of cholelithiasis and findings consistent with gastritis reactive thickening and possible ulcer as well as gallbladder wall thickening. Cholithiasis w/o stone obstruction and cholcystitis unlikely given negative US and CT. Risk factors with caffeine and smoking.   - Planned for Endoscopy today, will follow up results for r/o malignancy   - GI consulted    - Start pantoprazole 40mg BID and Maalox   - Will maintain NPO until scope   - elevated lipase and will repeat to reassess   - Pain control with po tylenol for mild to moderate and morphine for severe pain  - EKG sinus rhythm w/o st changes and troponins negative

## 2021-01-07 NOTE — H&P ADULT - HISTORY OF PRESENT ILLNESS
55 year old man without PMH (do not see outpatient doctor) presents with acute onset epigastric pain. He reports that for the past 2-3 days he has had decreased appetite without associated nausea or vomiting or early satiety. His PO intake has been drastically reduced 2/2 decreased appetite and he was unable to eat anything yesterday. In the evening yesterday the patient had acute onset 10/10 epigastric abdominal pain that has persisted through the night. Reportsdrinking 5-6 cups of coffee every single day.    Denies prior h/o EGD nor colonoscopy. NO personal nor family family of malignancy. Denies CP, SOB, fever, chill, n/v/c/d, nor urinary concerns.    ED:  Vitals stable  WBC 14, and lipase 168.  s/p 1L LR, Morphine 4mg x 3, and Sucralfate.  CT demonstrated gastritis with reactive gall bladder wall thickening and a normal pancreas/gall bladder. RUQ US showed one gall stone, 1cm. Negative Cameron. s/p surgery consult, no acute intervention needed, admitted to medicine for further management.  55 year old man without PMH (last doctor visit 3 years ago) presents with acute onset epigastric pain. He reports that for the past 2-3 days he has had decreased appetite and weakness without associated nausea or vomiting or early satiety until this morning he develop severe acute onset 10/10 sharp abdominal pain. The pain is located in the center of his abdomen and is worse with movement and it radiates to the b/l flanks when he stands up. Additionally, he reports for the past 4- 5 days he has had felt weak prior to developing his acute pain In the evening yesterday the patient had acute onset 10/10 epigastric abdominal pain that has persisted through the night. Reports drinking 7-8 cups of coffee every single day.    Denies prior h/o EGD nor colonoscopy. NO personal nor family family of malignancy. Denies CP, SOB, fever, chill, n/v/c/d, nor urinary concerns.    ED:  Vitals stable  WBC 14, and lipase 168.  s/p 1L LR, Morphine 4mg x 3, and Sucralfate.  CT demonstrated gastritis with reactive gall bladder wall thickening and a normal pancreas/gall bladder. RUQ US showed one gall stone, 1cm. Negative Cameron. s/p surgery consult, no acute intervention needed, admitted to medicine for further management.  55 year old man without PMH (last doctor visit 3 years ago) presents with acute onset epigastric pain. He reports that for the past 2-3 days he has had decreased appetite and weakness without associated nausea or vomiting or early satiety until this morning he develop severe acute onset 10/10 sharp abdominal pain. The pain is located in the center of his abdomen and is worse with movement and it radiates to the b/l flanks when he stands up. Additionally, he reports for the past 4- 5 days he has had felt weak prior to developing this acute pain In the evening yesterday. The patient reports that he has never experienced this before nor does he have pain after eating. Although he reports that he has had pain for several months after consuming coffee and tea intermittently. The patient denies any NSAID use or over the counter medication. He reports that he drinks 7-8 cups of coffee every single day. He denies any chills, weight loss, night sweats, fever, chest pain, sob, nausea, vomiting or diarrhea.     Denies prior h/o EGD nor colonoscopy. No personal nor family of malignancy. Denies CP, SOB, fever, chill, n/v/c/d, nor urinary concerns.    ED:  Vitals stable  WBC 14, and lipase 168.  s/p 1L LR, Morphine 4mg x 3, and Sucralfate.  CT demonstrated gastritis with reactive gall bladder wall thickening and a normal pancreas/gall bladder. RUQ US showed one gall stone, 1cm. Negative Cameron. s/p surgery consult, no acute intervention needed, admitted to medicine for further management.

## 2021-01-07 NOTE — H&P ADULT - NSHPPHYSICALEXAM_GEN_ALL_CORE
Vital Signs Last 24 Hrs  T(C): 37 (07 Jan 2021 06:15), Max: 37.1 (07 Jan 2021 05:25)  T(F): 98.6 (07 Jan 2021 06:15), Max: 98.7 (07 Jan 2021 05:25)  HR: 94 (07 Jan 2021 06:15) (74 - 94)  BP: 142/86 (07 Jan 2021 06:15) (100/61 - 142/86)  BP(mean): --  RR: 20 (07 Jan 2021 06:15) (17 - 20)  SpO2: 96% (07 Jan 2021 06:15) (94% - 99%)    PHYSICAL EXAM:  GENERAL: NAD, well-developed  HEAD:  Atraumatic, Normocephalic  EYES: EOMI, PERRLA, conjunctiva and sclera clear  NECK: Supple, No JVD  CHEST/LUNG: Clear to auscultation bilaterally; No wheeze  HEART: Regular rate and rhythm; No murmurs, rubs, or gallops  ABDOMEN: Soft, +TTP in epigastric area and mild discomfort in RUQ with deep palpation. Bowel sounds present. No rebound/guarding  EXTREMITIES:  2+ Peripheral Pulses, No clubbing, cyanosis, or edema  PSYCH: AAOx3  NEUROLOGY: non-focal  SKIN: No rashes or lesions Vital Signs Last 24 Hrs  T(C): 37 (07 Jan 2021 06:15), Max: 37.1 (07 Jan 2021 05:25)  T(F): 98.6 (07 Jan 2021 06:15), Max: 98.7 (07 Jan 2021 05:25)  HR: 94 (07 Jan 2021 06:15) (74 - 94)  BP: 142/86 (07 Jan 2021 06:15) (100/61 - 142/86)  BP(mean): --  RR: 20 (07 Jan 2021 06:15) (17 - 20)  SpO2: 96% (07 Jan 2021 06:15) (94% - 99%)    PHYSICAL EXAM:  GENERAL: NAD, well-developed  HEAD:  Atraumatic, Normocephalic  EYES: EOMI, PERRLA, conjunctiva and sclera clear  NECK: Supple, No JVD  CHEST/LUNG: Clear to auscultation bilaterally; No wheeze  HEART: Regular rate and rhythm; No murmurs, rubs, or gallops  ABDOMEN: Soft, +TTP in epigastric area and moderate discomfort in RUQ and LUQ with deep palpation and inspiration. No TTP in lower abdominal quadrants Bowel sounds present. No rebound/guarding  EXTREMITIES:  2+ Peripheral Pulses, No clubbing, cyanosis, or edema  PSYCH: AAOx3  NEUROLOGY: non-focal, 5/5 strength b/l upper and lower extremities   SKIN: No rashes or lesions

## 2021-01-07 NOTE — H&P ADULT - PROBLEM SELECTOR PLAN 3
The patient has evidence of gastritis or peptic ulcer disease based on CT findings. Now found to have mildly elevated lipase likely in the setting of GI inflammation. Will reassess tomorrow if no resolution of symptoms.

## 2021-01-07 NOTE — PATIENT PROFILE ADULT - NSTRANSFERBELONGINGSRESP_GEN_A_NUR
After Visit Summary   8/9/2018    Bridgette Carl    MRN: [id number]           Patient Information     Date Of Birth          1958        Visit Information        Provider Department      8/9/2018 3:40 PM Jc Perkins MD [facility]        Today's Diagnoses     Aphthous Stomatitis and mucositis of mouth    -  1      Care Instructions    Here is the plan from today's visit    1. Aphthous Stomatitis and mucositis of mouth  Given the characteristics and location of this ulcer-like lesion, she is likely recovering from an aphthous ulcer.  She has had similar ulcers in the past intermittently according to her sister however have never included external lips or face.  This is likely to be related to stress and inflammatory state of the mucosa however is less likely to be HSV.  Patient was advised to improve her oral hygiene as this could be a contributory factor.  No indication for antiviral therapy at this time.      Please call or return to clinic if your symptoms don't go away.    Follow up plan  Please make a clinic appointment for follow up with your primary physician Erin Grant MD as needed.     Thank you for coming to [facility] today.  Lab Testing:  **If you had lab testing today and your results are reassuring or normal they will be mailed to you or sent through [email or web address] within 7 days.   **If the lab tests need quick action we will call you with the results.  The phone number we will call with results is # [phone] (home) [phone] (work). If this is not the best number please call our clinic and change the number.  Medication Refills:  If you need any refills please call your pharmacy and they will contact us.   If you need to  your refill at a new pharmacy, please contact the new pharmacy directly. The new pharmacy will help you get your medications transferred faster.   Scheduling:  If you have any concerns about today's visit or wish to  schedule another appointment please call our office during normal business hours [phone] (8-5:00 M-F)  If a referral was made to a [facility] and you don't get a call from central scheduling please call [phone].  If a Mammogram was ordered for you at [facility] call [phone] to schedule or change your appointment.  If you had an XRay/CT/Ultrasound/MRI ordered the number is [phone] to schedule or change your radiology appointment.   Medical Concerns:  If you have urgent medical concerns please call [phone] at any time of the day.    Jc Perkins MD            Follow-ups after your visit        Follow-up notes from your care team     Return if symptoms worsen or fail to improve.      Who to contact     Please call your clinic at [phone] to:    Ask questions about your health    Make or cancel appointments    Discuss your medicines    Learn about your test results    Speak to your doctor            Additional Information About Your Visit        [employer] Information     Better Walk is an electronic gateway that provides easy, online access to your medical records. With Better Walk, you can request a clinic appointment, read your test results, renew a prescription or communicate with your care team.     To sign up for Better Walk visit the website at [email or web address]   You will be asked to enter the access code listed below, as well as some personal information. Please follow the directions to create your username and password.     Your access code is: [email or web address]  Expires: 2018  9:41 PM     Your access code will  in 90 days. If you need help or a new code, please contact your [facility] or call [phone] for assistance.        Care EveryWhere ID     This is your Care EveryWhere ID. This could be used by other organizations to access your [facility] medical records  [id number]        Your Vitals Were     Pulse  Temperature Respirations Pulse Oximetry BMI (Body Mass Index)       59 98.2  F (36.8  C) (Oral) 16 99% 17.45 kg/m2        Blood Pressure from Last 3 Encounters:   08/09/18 108/64   03/30/18 101/60   12/01/17 101/64    Weight from Last 3 Encounters:   08/09/18 94 lb (42.6 kg)   03/30/18 94 lb 12.8 oz (43 kg)   12/01/17 94 lb 9.6 oz (42.9 kg)              Today, you had the following     No orders found for display       Primary Care Provider Office Phone # Fax #    Erin Grant [phone]       [address] [address]        Equal Access to Services     [place] : Amanda Richards, watrinidad luqadaha, qaybta kaalmada adealan, dee dee acevedo . So [facility] [phone].    ATENCIÓN: Si habla español, tiene a carvajal disposición servicios gratuitos de asistencia lingüística. Ly al [phone].    We comply with applicable federal civil rights laws and [place] laws. We do not discriminate on the basis of race, color, national origin, age, disability, sex, sexual orientation, or gender identity.            Thank you!     Thank you for choosing [facility]  for your care. Our goal is always to provide you with excellent care. Hearing back from our patients is one way we can continue to improve our services. Please take a few minutes to complete the written survey that you may receive in the mail after your visit with us. Thank you!             Your Updated Medication List - Protect others around you: Learn how to safely use, store and throw away your medicines at www.disposemymeds.org.          This list is accurate as of 8/9/18 11:59 PM.  Always use your most recent med list.                   Brand Name Dispense Instructions for use Diagnosis    acetaminophen 500 MG tablet    TYLENOL    100 tablet    Take 1-2 tablets (500-1,000 mg) by mouth every 6 hours as needed for mild pain    Scoliosis (and  kyphoscoliosis), idiopathic       ammonium lactate 12 % cream    AMLACTIN    140 g    Apply topically daily    Dry skin       calcium carbonate-vitamin D 500-400 MG-UNIT Tabs per tablet     180 tablet    Take 1 tablet by mouth 2 times daily    Routine general medical examination at a health care facility       carboxymethylcellulose 0.5 % Soln ophthalmic solution    REFRESH PLUS    1 Bottle    Place 1 drop into both eyes daily as needed for dry eyes    Dry eyes       cholecalciferol 19805 units capsule    VITAMIN D3    16 capsule    Take 1 capsule (50,000 Units) by mouth once a week    Vitamin D deficiency       conjugated estrogens cream    PREMARIN    30 g    Place 0.5 g vaginally twice a week As needed for vaginal dryness.    Postmenopausal status       docusate sodium 100 MG tablet    COLACE    60 tablet    Take 100 mg by mouth 2 times daily as needed for constipation    Chronic constipation       fluticasone 50 MCG/ACT spray    FLONASE    16 g    Spray 1-2 sprays into both nostrils daily    Nasal congestion       guaiFENesin-dextromethorphan 100-10 MG/5ML syrup    ROBITUSSIN DM    236 mL    Take 5 mLs by mouth every 4 hours as needed for cough    Cough       hydrocortisone 2.5 % cream      Apply topically every night before bed for hemorrhoids        hydrocortisone 25 MG Suppository    ANUSOL-HC    28 suppository    Place 1 suppository (25 mg) rectally 2 times daily as needed for hemorrhoids    External hemorrhoids       olopatadine 0.1 % ophthalmic solution    PATANOL    1 Bottle    Place 1 drop into both eyes 2 times daily    Chronic allergic conjunctivitis       order for DME     1 Units    Equipment being ordered: donut cushion    Sacral pain       traZODone 50 MG tablet    DESYREL    30 tablet    Take 1 tablet (50 mg) by mouth nightly as needed for sleep    Insomnia, unspecified type       Witch Hazel Pads     1 each    Use prn bowel movements    Constipation          yes

## 2021-01-07 NOTE — H&P ADULT - PROBLEM SELECTOR PLAN 4
DVT: SCD  Diet: NPO for now, pending GI Eval  Dispo: home after GI eval and pain control DVT: SCD  Diet: NPO for now, pending GI Eval  Bowel Regimen: Pantoprazole, Maalox   Code: Full code   Dispo: home after GI eval and pain control

## 2021-01-07 NOTE — H&P ADULT - PROBLEM SELECTOR PLAN 2
The patient is presenting with acute epigastric pain in the setting of CT findings with gastric thickening and possible ulcer as well as gall bladder thickening. US findings show cholelithiasis without stone obstruction or infection. Most likely reactive in the setting of gastritis or peptic ulcer disease.  - No evidence of acute infection will monitor off antibiotics   - if fever or acute decompensation will start abx with ciprofloxacin and flagyl

## 2021-01-07 NOTE — H&P ADULT - ATTENDING COMMENTS
acute epigastric pain x 1 week in the setting of significant caffeine/ tobacco use- PUD vs. gastritis/ duodenitis/ esophagitis vs. malignancy; acute cholecystitis/ acute pancreatitis less likely- to have EGD today; start PPI, NPO except meds- GI/ surgery following. CTA chest/ abdomen with gastric and gallbladder thickening.     Shelly English D.O.  Hospitalist Pager # [phone]

## 2021-01-07 NOTE — CONSULT NOTE ADULT - SUBJECTIVE AND OBJECTIVE BOX
[facility] General Surgery Consultation     Patient is a 55y old  Male who presents with a chief complaint of epigastric pain  History obtained via  in Japanese    HPI:  Mr. Montano is a 55 year old man without PMH because he does not see a doctor who presents with acute onset abdominal pain. He reports that for the past 2-3 days he has had decreased appetite without associated nausea or vomiting or early satiety. His PO intake has been drastically reduced 2/2 decreased appetite and he was unable to eat anything yesterday. In the evening yesterday the patient had acute onset 10/10 epigastric abdominal pain that has persisted through the night. The pain has remained mostly epigastric with some radiation throughout the rest of the abdomen, and persists despite 8mg morphine in the ED. He denies nausea or vomiting overnight, denies fever or chills, constipation or diarrhea. He specifically denied any weight loss recently, night sweats (except tonight), or bloody/dark bowel movements. He has never had pain like this in the past, he has never had a colonoscopy or endoscopy before. He smokes 3-4 cigarettes a day for the "his whole life". about 35yrs assuming began at adolescence. He drinks sporadically at big occasions or birthdays and drinks 5-6 cups of coffee every single day and has done his whole adult life. He reports no family history of disease or more specifically, any history of cancer.     In the ED the patient is afebrile, normotensive, and normocardic. labs show leukocytosis to 14, normal LFTs, and normal lactate. Lipase was elevated to 168, CT demonstrated gastritis with reactive gall bladder wall thickening and a normal pancreas/gall bladder. Additionally, RUQ US showed one gall stone, 1cm.    PAST MEDICAL & SURGICAL HISTORY:  No pertinent past medical history    No significant past surgical history        FAMILY HISTORY:  No history of cancer    SOCIAL HISTORY:  As noted in HPI    Allergies  None    Vital Signs Last 24 Hrs  T(C): 36.7 (06 Jan 2021 22:25), Max: 36.7 (06 Jan 2021 22:25)  T(F): 98 (06 Jan 2021 22:25), Max: 98 (06 Jan 2021 22:25)  HR: 80 (06 Jan 2021 22:25) (74 - 80)  BP: 125/76 (06 Jan 2021 22:25) (100/61 - 125/76)  BP(mean): --  RR: 17 (06 Jan 2021 22:25) (17 - 18)  SpO2: 99% (06 Jan 2021 22:25) (94% - 99%)  Daily     Daily     Examination  General: NAD, well-nourished  HEENT: Atraumatic, EOMI  Resp: Breathing comfortably on RA  CV: Normal sinus rhythm  Abd: soft, tender in the epigastric region with some guarding, no rebound tenderness, mildly tender elsewhere in the abdomen, no significant RUQ tenderness  Ext: ROMIx4, motor strength intact x 4                          15.7   14.20 )-----------( 247      ( 06 Jan 2021 21:56 )             48.9     01-06    136  |  99  |  16  ----------------------------<  128<H>  4.1   |  24  |  0.97    Ca    9.9      06 Jan 2021 21:56    TPro  7.1  /  Alb  4.7  /  TBili  0.7  /  DBili  x   /  AST  13  /  ALT  18  /  AlkPhos  42  01-06          Radiographic Findings:   < from: CT Angio Chest w/ IV Cont (01.06.21 @ 22:04) >  FINDINGS:    CT CHEST:    LUNGS AND LARGE AIRWAYS: Bilateral lower lobe and lingularopacities representing due to interstitial edema, atelectasis, no trapping.  PLEURA: No pleural effusion. No pneumothorax.  VESSELS: No intramural hematoma, penetrating aortic ulcer or aortic dissection.  HEART: Heart size is normal. No pericardial effusion.  MEDIASTINUM AND HENRIETTA: No lymphadenopathy.  CHEST WALL AND LOWER NECK: Within normal limits.    CT ABDOMEN AND PELVIS:    LIVER: Within normal limits.  BILE DUCTS: Normal caliber.  GALLBLADDER: Within normal limits.  SPLEEN: Within normal limits.  PANCREAS: Within normal limits.  ADRENALS: Thickened left adrenal gland.    KIDNEYS/URETERS: Within normal limits.  BLADDER: Within normal limits.  REPRODUCTIVE ORGANS: Prostate is enlarged.    BOWEL: Gastric wall thickening with submucosal edema of the gastric antrum and pylorus with surrounding inflammatory change. Small gas containing focus in the gastric body wall the lesser curvature may reflect a small ulcer (5-313). Associated gallbladder wall thickening is likely reactive.. No bowel obstruction. Appendix is normal. Colonic diverticulosis.  PERITONEUM: No free air.    VESSELS: No penetrating aortic ulcer or aortic dissection. No aneurysm.  RETROPERITONEUM/LYMPH NODES: Within normal limits.  ABDOMINAL WALL: Small to moderate fat-containing left and small right inguinal hernia.  BONES:  Limbus L3 vertebral body. Multilevel thoracic lumbar spondylosis with prominent anterior osteophyte formation.    IMPRESSION:    No aortic dissection or aneurysm.    Findings is consistent with gastritis reactive thickening of the gallbladder wall. Consider upper endoscopy once acute symptoms resolve.    < end of copied text >  < from: US Abdomen Complete (US Abdomen Complete .) (01.07.21 @ 00:17) >  FINDINGS:    Liver: Within normal limits.  Bile ducts: Normal caliber. Common bile duct measures 3 mm.  Gallbladder: A 1.0 x 0.6 x 0.3 cm stone. Negative Cameron sign.  Pancreas: Poorly visualized.  Spleen: 10.9 cm. Within normal limits.  Right kidney: 12.2 cm. No hydronephrosis.  Left kidney: 11.4 cm.  No hydronephrosis.  Ascites: None.  Aorta and IVC: Poorly visualized.    IMPRESSION:    Cholelithiasis with a negative sonographic Cameron sign. If there is further clinical concern for acute cholecystitis, a HIDA scan is suggested for further evaluation.      < end of copied text >      Assessment:   Mr. Montano is a 55 year old man without PMH because he does not see a doctor who presents with acute onset epigastric pain, found  to have gastritis on CT scan with reactive gall bladder wall thickening and elevation in lipase.    Plan:  - CT demonstrates marked gastritis, likely mild elevation in lipase is reactive, in addition to gall bladder wall thickening as mentioned by radiology  - pancrease wnl on CT scan  - patient gastritis likely secondary to daily smoking and coffee usage (5-6 cups/day)  - recommend endoscopy to rule out ulcer, malignancy given lack of medical care  - cholelithiasis noted, likely not causing gall stone pancreatitis given normal LFTs, normal pancreas, and no common bile duct dilation; could follow up for elective surgery if any RUQ symptoms develop after gastritis episode    Discussed with Dr. Larose  Red Team Surgery  [phone]

## 2021-01-07 NOTE — H&P ADULT - NSHPLABSRESULTS_GEN_ALL_CORE
15.7   14.20 )-----------( 247      ( 06 Jan 2021 21:56 )             48.9       01-06    136  |  99  |  16  ----------------------------<  128<H>  4.1   |  24  |  0.97    Ca    9.9      06 Jan 2021 21:56    TPro  7.1  /  Alb  4.7  /  TBili  0.7  /  DBili  x   /  AST  13  /  ALT  18  /  AlkPhos  42  01-06            Lactate Trend      CAPILLARY BLOOD GLUCOSE      < from: CT Angio Chest w/ IV Cont (01.06.21 @ 22:04) >    IMPRESSION:    No aortic dissection or aneurysm.    Findings is consistent with gastritis reactive thickening of the gallbladder wall. Consider upper endoscopy once acute symptoms resolve.    < end of copied text >    < from: US Abdomen Complete (US Abdomen Complete .) (01.07.21 @ 00:17) >    IMPRESSION:    Cholelithiasis with a negative sonographic Cameron sign. If there is further clinical concern for acute cholecystitis, a HIDA scan is suggested for further evaluation.    < end of copied text >    EKG: NSR, HR 78, no acute ST changes

## 2021-01-07 NOTE — CONSULT NOTE ADULT - ATTENDING COMMENTS
pt imaging and chart reviewed  agree with note above  f/u GI for EGD  cont supportive care per med  no acute sx intervention indicated at this time
52 yo M no sig pmh but doesn't follow with MD who presents with severe abdominal pain.  CT with signs of gastritis vs. ulcer.  Labs with elevated wbc, lipase - but most likely acute phase 2/2 ulcer.  Will plan on EGD for assessment and to r/o other causes including malignancy.  Exam tender in epigastrium but soft.    NPO  IV PPI  EGD

## 2021-01-07 NOTE — DISCHARGE NOTE NURSING/CASE MANAGEMENT/SOCIAL WORK - PATIENT PORTAL LINK FT
You can access the FollowMyHealth Patient Portal offered by [facility] by registering at the following website: [email or web address]. By joining [employer]’s FollowMyHealth portal, you will also be able to view your health information using other applications (apps) compatible with our system.

## 2021-01-07 NOTE — H&P ADULT - ASSESSMENT
55 year old man without PMH (do not see outpatient doctor) presents with acute onset epigastric pain, concerning for gastritis vs. ulcer vs. malignancy.  55 year old man without PMH (do not see outpatient doctor) presents with acute onset epigastric pain, concerning for gastritis vs. ulcer vs. malignancy. Patient has multiple risk factors including smoking and heavy caffeine intake.

## 2021-01-07 NOTE — CONSULT NOTE ADULT - ASSESSMENT
55 year old man without PMH (do not see outpatient doctor) presents with acute onset epigastric pain, concerning for gastritis vs. ulcer vs. malignancy. Patient has multiple risk factors including smoking and heavy caffeine intake, w/ GI c/s for further w/u and management of presenting symptoms and imaging finding of gastritis w/ reactive gallbladder wall thickening.

## 2021-01-08 LAB
A1C WITH ESTIMATED AVERAGE GLUCOSE RESULT: 5.7 % — HIGH (ref 4–5.6)
ALBUMIN SERPL ELPH-MCNC: 3.7 G/DL — SIGNIFICANT CHANGE UP (ref 3.3–5)
ALP SERPL-CCNC: 37 U/L — LOW (ref 40–120)
ALT FLD-CCNC: 14 U/L — SIGNIFICANT CHANGE UP (ref 10–45)
ANION GAP SERPL CALC-SCNC: 10 MMOL/L — SIGNIFICANT CHANGE UP (ref 5–17)
APPEARANCE UR: CLEAR — SIGNIFICANT CHANGE UP
AST SERPL-CCNC: 11 U/L — SIGNIFICANT CHANGE UP (ref 10–40)
BACTERIA # UR AUTO: NEGATIVE — SIGNIFICANT CHANGE UP
BASOPHILS # BLD AUTO: 0.04 K/UL — SIGNIFICANT CHANGE UP (ref 0–0.2)
BASOPHILS NFR BLD AUTO: 0.3 % — SIGNIFICANT CHANGE UP (ref 0–2)
BILIRUB SERPL-MCNC: 0.9 MG/DL — SIGNIFICANT CHANGE UP (ref 0.2–1.2)
BILIRUB UR-MCNC: NEGATIVE — SIGNIFICANT CHANGE UP
BUN SERPL-MCNC: 15 MG/DL — SIGNIFICANT CHANGE UP (ref 7–23)
CALCIUM SERPL-MCNC: 9.4 MG/DL — SIGNIFICANT CHANGE UP (ref 8.4–10.5)
CHLORIDE SERPL-SCNC: 98 MMOL/L — SIGNIFICANT CHANGE UP (ref 96–108)
CHOLEST SERPL-MCNC: 163 MG/DL — SIGNIFICANT CHANGE UP
CO2 SERPL-SCNC: 24 MMOL/L — SIGNIFICANT CHANGE UP (ref 22–31)
COLOR SPEC: ABNORMAL
CREAT SERPL-MCNC: 0.96 MG/DL — SIGNIFICANT CHANGE UP (ref 0.5–1.3)
DIFF PNL FLD: NEGATIVE — SIGNIFICANT CHANGE UP
EOSINOPHIL # BLD AUTO: 0.05 K/UL — SIGNIFICANT CHANGE UP (ref 0–0.5)
EOSINOPHIL NFR BLD AUTO: 0.4 % — SIGNIFICANT CHANGE UP (ref 0–6)
EPI CELLS # UR: 0 /HPF — SIGNIFICANT CHANGE UP
ESTIMATED AVERAGE GLUCOSE: 117 MG/DL — HIGH (ref 68–114)
GLUCOSE SERPL-MCNC: 98 MG/DL — SIGNIFICANT CHANGE UP (ref 70–99)
GLUCOSE UR QL: NEGATIVE — SIGNIFICANT CHANGE UP
HCT VFR BLD CALC: 45.3 % — SIGNIFICANT CHANGE UP (ref 39–50)
HDLC SERPL-MCNC: 55 MG/DL — SIGNIFICANT CHANGE UP
HGB BLD-MCNC: 14.6 G/DL — SIGNIFICANT CHANGE UP (ref 13–17)
HYALINE CASTS # UR AUTO: 1 /LPF — SIGNIFICANT CHANGE UP (ref 0–2)
IMM GRANULOCYTES NFR BLD AUTO: 0.4 % — SIGNIFICANT CHANGE UP (ref 0–1.5)
KETONES UR-MCNC: ABNORMAL
LEUKOCYTE ESTERASE UR-ACNC: NEGATIVE — SIGNIFICANT CHANGE UP
LIPID PNL WITH DIRECT LDL SERPL: 93 MG/DL — SIGNIFICANT CHANGE UP
LYMPHOCYTES # BLD AUTO: 1.41 K/UL — SIGNIFICANT CHANGE UP (ref 1–3.3)
LYMPHOCYTES # BLD AUTO: 11.1 % — LOW (ref 13–44)
MAGNESIUM SERPL-MCNC: 2.2 MG/DL — SIGNIFICANT CHANGE UP (ref 1.6–2.6)
MCHC RBC-ENTMCNC: 28 PG — SIGNIFICANT CHANGE UP (ref 27–34)
MCHC RBC-ENTMCNC: 32.2 GM/DL — SIGNIFICANT CHANGE UP (ref 32–36)
MCV RBC AUTO: 86.8 FL — SIGNIFICANT CHANGE UP (ref 80–100)
MONOCYTES # BLD AUTO: 0.64 K/UL — SIGNIFICANT CHANGE UP (ref 0–0.9)
MONOCYTES NFR BLD AUTO: 5 % — SIGNIFICANT CHANGE UP (ref 2–14)
NEUTROPHILS # BLD AUTO: 10.55 K/UL — HIGH (ref 1.8–7.4)
NEUTROPHILS NFR BLD AUTO: 82.8 % — HIGH (ref 43–77)
NITRITE UR-MCNC: NEGATIVE — SIGNIFICANT CHANGE UP
NON HDL CHOLESTEROL: 108 MG/DL — SIGNIFICANT CHANGE UP
NRBC # BLD: 0 /100 WBCS — SIGNIFICANT CHANGE UP (ref 0–0)
PH UR: 5.5 — SIGNIFICANT CHANGE UP (ref 5–8)
PHOSPHATE SERPL-MCNC: 1.7 MG/DL — LOW (ref 2.5–4.5)
PLATELET # BLD AUTO: 208 K/UL — SIGNIFICANT CHANGE UP (ref 150–400)
POTASSIUM SERPL-MCNC: 4 MMOL/L — SIGNIFICANT CHANGE UP (ref 3.5–5.3)
POTASSIUM SERPL-SCNC: 4 MMOL/L — SIGNIFICANT CHANGE UP (ref 3.5–5.3)
PROT SERPL-MCNC: 6.5 G/DL — SIGNIFICANT CHANGE UP (ref 6–8.3)
PROT UR-MCNC: ABNORMAL
RBC # BLD: 5.22 M/UL — SIGNIFICANT CHANGE UP (ref 4.2–5.8)
RBC # FLD: 12.9 % — SIGNIFICANT CHANGE UP (ref 10.3–14.5)
RBC CASTS # UR COMP ASSIST: 3 /HPF — SIGNIFICANT CHANGE UP (ref 0–4)
SARS-COV-2 IGG SERPL QL IA: NEGATIVE — SIGNIFICANT CHANGE UP
SARS-COV-2 IGM SERPL IA-ACNC: 0.06 INDEX — SIGNIFICANT CHANGE UP
SODIUM SERPL-SCNC: 132 MMOL/L — LOW (ref 135–145)
SP GR SPEC: 1.03 — HIGH (ref 1.01–1.02)
TRIGL SERPL-MCNC: 74 MG/DL — SIGNIFICANT CHANGE UP
UROBILINOGEN FLD QL: NEGATIVE — SIGNIFICANT CHANGE UP
WBC # BLD: 12.74 K/UL — HIGH (ref 3.8–10.5)
WBC # FLD AUTO: 12.74 K/UL — HIGH (ref 3.8–10.5)
WBC UR QL: 2 /HPF — SIGNIFICANT CHANGE UP (ref 0–5)

## 2021-01-08 PROCEDURE — 99233 SBSQ HOSP IP/OBS HIGH 50: CPT | Mod: GC

## 2021-01-08 PROCEDURE — 99232 SBSQ HOSP IP/OBS MODERATE 35: CPT

## 2021-01-08 RX ORDER — SENNA PLUS 8.6 MG/1
2 TABLET ORAL AT BEDTIME
Refills: 0 | Status: DISCONTINUED | OUTPATIENT
Start: 2021-01-08 | End: 2021-01-09

## 2021-01-08 RX ORDER — POLYETHYLENE GLYCOL 3350 17 G/17G
17 POWDER, FOR SOLUTION ORAL DAILY
Refills: 0 | Status: DISCONTINUED | OUTPATIENT
Start: 2021-01-08 | End: 2021-01-09

## 2021-01-08 RX ORDER — OXYCODONE HYDROCHLORIDE 5 MG/1
5 TABLET ORAL EVERY 6 HOURS
Refills: 0 | Status: DISCONTINUED | OUTPATIENT
Start: 2021-01-08 | End: 2021-01-09

## 2021-01-08 RX ORDER — OXYCODONE HYDROCHLORIDE 5 MG/1
5 TABLET ORAL EVERY 4 HOURS
Refills: 0 | Status: DISCONTINUED | OUTPATIENT
Start: 2021-01-08 | End: 2021-01-08

## 2021-01-08 RX ORDER — LIDOCAINE 4 G/100G
10 CREAM TOPICAL ONCE
Refills: 0 | Status: COMPLETED | OUTPATIENT
Start: 2021-01-08 | End: 2021-01-08

## 2021-01-08 RX ORDER — ACETAMINOPHEN 500 MG
650 TABLET ORAL EVERY 6 HOURS
Refills: 0 | Status: DISCONTINUED | OUTPATIENT
Start: 2021-01-08 | End: 2021-01-09

## 2021-01-08 RX ORDER — SODIUM,POTASSIUM PHOSPHATES 278-250MG
1 POWDER IN PACKET (EA) ORAL ONCE
Refills: 0 | Status: COMPLETED | OUTPATIENT
Start: 2021-01-08 | End: 2021-01-08

## 2021-01-08 RX ADMIN — Medication 1 GRAM(S): at 12:10

## 2021-01-08 RX ADMIN — SENNA PLUS 2 TABLET(S): 8.6 TABLET ORAL at 21:22

## 2021-01-08 RX ADMIN — Medication 30 MILLILITER(S): at 21:22

## 2021-01-08 RX ADMIN — Medication 1 GRAM(S): at 05:24

## 2021-01-08 RX ADMIN — SODIUM CHLORIDE 30 MILLILITER(S): 9 INJECTION INTRAMUSCULAR; INTRAVENOUS; SUBCUTANEOUS at 21:22

## 2021-01-08 RX ADMIN — PANTOPRAZOLE SODIUM 40 MILLIGRAM(S): 20 TABLET, DELAYED RELEASE ORAL at 17:44

## 2021-01-08 RX ADMIN — OXYCODONE HYDROCHLORIDE 5 MILLIGRAM(S): 5 TABLET ORAL at 12:10

## 2021-01-08 RX ADMIN — Medication 30 MILLILITER(S): at 16:16

## 2021-01-08 RX ADMIN — Medication 650 MILLIGRAM(S): at 17:44

## 2021-01-08 RX ADMIN — PANTOPRAZOLE SODIUM 40 MILLIGRAM(S): 20 TABLET, DELAYED RELEASE ORAL at 05:24

## 2021-01-08 RX ADMIN — Medication 1 GRAM(S): at 23:26

## 2021-01-08 RX ADMIN — POLYETHYLENE GLYCOL 3350 17 GRAM(S): 17 POWDER, FOR SOLUTION ORAL at 17:44

## 2021-01-08 RX ADMIN — MORPHINE SULFATE 2 MILLIGRAM(S): 50 CAPSULE, EXTENDED RELEASE ORAL at 05:22

## 2021-01-08 RX ADMIN — Medication 1 PACKET(S): at 12:10

## 2021-01-08 RX ADMIN — Medication 650 MILLIGRAM(S): at 23:26

## 2021-01-08 RX ADMIN — LIDOCAINE 10 MILLILITER(S): 4 CREAM TOPICAL at 17:44

## 2021-01-08 RX ADMIN — Medication 650 MILLIGRAM(S): at 12:10

## 2021-01-08 RX ADMIN — Medication 1 GRAM(S): at 17:45

## 2021-01-08 RX ADMIN — OXYCODONE HYDROCHLORIDE 5 MILLIGRAM(S): 5 TABLET ORAL at 17:44

## 2021-01-08 NOTE — PROGRESS NOTE ADULT - SUBJECTIVE AND OBJECTIVE BOX
SURGERY DAILY PROGRESS NOTE:       SUBJECTIVE/ROS: Patient seen and evaluated on AM rounds. Patient with some abdominal pain and some nausea. Tolerating a liquid diet. Patient had an EGD yesterday that demonstrates esophagitis, erythematous mucosa in the antrum, one non-bleeding duodenal ulcer and diffuse ulcerated duodenitis.        OBJECTIVE:    Vital Signs Last 24 Hrs  T(C): 36.6 (2021 05:51), Max: 37.4 (2021 11:47)  T(F): 97.9 (2021 05:51), Max: 99.4 (2021 11:47)  HR: 92 (2021 05:51) (82 - 99)  BP: 130/78 (2021 05:51) (101/60 - 130/78)  BP(mean): --  RR: 18 (2021 05:51) (16 - 25)  SpO2: 93% (2021 05:51) (93% - 96%)  I&O's Detail    2021 07:01  -  2021 07:00  --------------------------------------------------------  IN:    Oral Fluid: 480 mL    sodium chloride 0.9%: 360 mL  Total IN: 840 mL    OUT:  Total OUT: 0 mL    Total NET: 840 mL        Daily     Daily Weight in k.8 (2021 08:14)  MEDICATIONS  (STANDING):  acetaminophen   Tablet .. 650 milliGRAM(s) Oral every 6 hours  pantoprazole    Tablet 40 milliGRAM(s) Oral two times a day  potassium phosphate / sodium phosphate Powder (PHOS-NaK) 1 Packet(s) Oral once  sodium chloride 0.9%. 1000 milliLiter(s) (30 mL/Hr) IV Continuous <Continuous>  sucralfate 1 Gram(s) Oral four times a day    MEDICATIONS  (PRN):  aluminum hydroxide/magnesium hydroxide/simethicone Suspension 30 milliLiter(s) Oral every 4 hours PRN Dyspepsia  oxyCODONE    IR 5 milliGRAM(s) Oral every 6 hours PRN Severe Pain (7 - 10)      LABS:                        14.6   12.74 )-----------( 208      ( 2021 07:04 )             45.3     01-08    132<L>  |  98  |  15  ----------------------------<  98  4.0   |  24  |  0.96    Ca    9.4      2021 07:04  Phos  1.7     -  Mg     2.2     -08    TPro  6.5  /  Alb  3.7  /  TBili  0.9  /  DBili  x   /  AST  11  /  ALT  14  /  AlkPhos  37<L>  08      Urinalysis Basic - ( 2021 08:48 )    Color: Light Orange / Appearance: Clear / S.030 / pH: x  Gluc: x / Ketone: Small  / Bili: Negative / Urobili: Negative   Blood: x / Protein: Trace / Nitrite: Negative   Leuk Esterase: Negative / RBC: 3 /hpf / WBC 2 /HPF   Sq Epi: x / Non Sq Epi: 0 /hpf / Bacteria: Negative      Physical Examination:   General: NAD, well-nourished  HEENT: Atraumatic, EOMI  Resp: Breathing comfortably on RA  CV: Normal sinus rhythm  Abd: soft, tender in the RLQ and LLQ, no rebound tenderness, mildly tender elsewhere in the abdomen, no significant RUQ tenderness  Ext: ROMIx4, motor strength intact x 4

## 2021-01-08 NOTE — PROGRESS NOTE ADULT - ASSESSMENT
55 year old man without PMH (do not see outpatient doctor) presents with acute onset epigastric pain, concerning for gastritis vs. ulcer vs. malignancy. Patient has multiple risk factors including smoking and heavy caffeine intake.  55 year old man without PMH (do not see outpatient doctor) presents with acute onset epigastric pain, found to have duodenitis and gastric ulcers possible 2/2 to peptic ulcer disease vs. H. pylori infection. Multiple risk factors including caffeine and smoking.  55 year old man without PMH (do not see outpatient doctor) presents with acute onset epigastric pain, found to have duodenitis and gastric ulcers possible 2/2 to peptic ulcer disease vs. H. pylori infection. Multiple risk factors including caffeine, smoking, and H pylori exposure.

## 2021-01-08 NOTE — PROGRESS NOTE ADULT - SUBJECTIVE AND OBJECTIVE BOX
Eric Chan, PGY-1  Internal Medicine  Pager: UF - [phone] and ZPO - [phone]     PROGRESS NOTE:    ID #:     Patient is a 55y old  Male who presents with a chief complaint of abdominal pain (07 Jan 2021 09:17)      MAJOR INTERVAL HOSPITAL EVENTS:     SUBJECTIVE / OVERNIGHT EVENTS: No acute overnight events.     REVIEW OF SYSTEMS:  CONSTITUTIONAL: No weakness, fevers or chills  EYES/ENT: No visual changes;  No vertigo or throat pain   NECK: No pain or stiffness  RESPIRATORY: No cough, wheezing, hemoptysis; No shortness of breath  CARDIOVASCULAR: No chest pain or palpitations  GASTROINTESTINAL: No abdominal or epigastric pain. No nausea, vomiting, or hematemesis; No diarrhea or constipation. No melena or hematochezia.  GENITOURINARY: No dysuria, frequency or hematuria  NEUROLOGICAL: No numbness or weakness  SKIN: No itching, burning, rashes, or lesions   All other review of systems is negative unless indicated above.    MEDICATIONS  (STANDING):  pantoprazole    Tablet 40 milliGRAM(s) Oral two times a day  sodium chloride 0.9%. 1000 milliLiter(s) (30 mL/Hr) IV Continuous <Continuous>  sucralfate 1 Gram(s) Oral four times a day    MEDICATIONS  (PRN):  acetaminophen   Tablet .. 650 milliGRAM(s) Oral every 6 hours PRN Mild Pain (1 - 3), Moderate Pain (4 - 6)  aluminum hydroxide/magnesium hydroxide/simethicone Suspension 30 milliLiter(s) Oral every 4 hours PRN Dyspepsia  morphine  - Injectable 2 milliGRAM(s) IV Push every 6 hours PRN Severe Pain (7 - 10)      CAPILLARY BLOOD GLUCOSE        I&O's Summary      PHYSICAL EXAM:  Vital Signs Last 24 Hrs  T(C): 36.6 (08 Jan 2021 05:51), Max: 37.4 (07 Jan 2021 11:47)  T(F): 97.9 (08 Jan 2021 05:51), Max: 99.4 (07 Jan 2021 11:47)  HR: 92 (08 Jan 2021 05:51) (82 - 99)  BP: 130/78 (08 Jan 2021 05:51) (101/60 - 130/78)  BP(mean): --  RR: 18 (08 Jan 2021 05:51) (16 - 25)  SpO2: 93% (08 Jan 2021 05:51) (93% - 96%)    GENERAL: No acute distress, well-developed  HEAD:  Atraumatic, Normocephalic  EYES: EOMI, PERRLA, conjunctiva and sclera clear  NECK: Supple, no lymphadenopathy, no JVD  CHEST/LUNG: CTAB; No wheezes, rales, or rhonchi  HEART: Regular rate and rhythm; Normal s1 and s2, No murmurs, rubs, or gallops  ABDOMEN: Soft, non-tender, non-distended; normal bowel sounds, no organomegaly  EXTREMITIES:  2+ peripheral pulses b/l, No clubbing, cyanosis, or edema  NEUROLOGY: A&O x 3, no focal deficits  SKIN: No rashes or lesions          LABS:                        14.6   12.74 )-----------( 208      ( 08 Jan 2021 07:04 )             45.3     01-06    136  |  99  |  16  ----------------------------<  128<H>  4.1   |  24  |  0.97    Ca    9.9      06 Jan 2021 21:56    TPro  7.1  /  Alb  4.7  /  TBili  0.7  /  DBili  x   /  AST  13  /  ALT  18  /  AlkPhos  42  01-06                RADIOLOGY & ADDITIONAL TESTS:  Results Reviewed:   Imaging Personally Reviewed:  Electrocardiogram Personally Reviewed:    COORDINATION OF CARE:  Care Discussed with Consultants/Other Providers [Y/N]:  Prior or Outpatient Records Reviewed [Y/N]:   Eric Chan, PGY-1  Internal Medicine  Pager: VU - [phone] and URW - [phone]     PROGRESS NOTE:    ID #:     Patient is a 55y old  Male who presents with a chief complaint of abdominal pain (07 Jan 2021 09:17)      MAJOR INTERVAL HOSPITAL EVENTS:   SUBJECTIVE / OVERNIGHT EVENTS: No acute overnight events. Continues to have significant abdominal pain and requires morphine. The patient was reports feeling better then yesterday.     REVIEW OF SYSTEMS:  CONSTITUTIONAL: No weakness, fevers or chills  EYES/ENT: No visual changes;  No vertigo or throat pain   NECK: No pain or stiffness  RESPIRATORY: No cough, wheezing, hemoptysis; No shortness of breath  CARDIOVASCULAR: No chest pain or palpitations  GASTROINTESTINAL: + abdominal and epigastric pain. No nausea, vomiting, or hematemesis; No diarrhea or constipation. No melena or hematochezia.  GENITOURINARY: No dysuria, frequency or hematuria  NEUROLOGICAL: No numbness or weakness  SKIN: No itching, burning, rashes, or lesions   All other review of systems is negative unless indicated above.    MEDICATIONS  (STANDING):  pantoprazole    Tablet 40 milliGRAM(s) Oral two times a day  sodium chloride 0.9%. 1000 milliLiter(s) (30 mL/Hr) IV Continuous <Continuous>  sucralfate 1 Gram(s) Oral four times a day    MEDICATIONS  (PRN):  acetaminophen   Tablet .. 650 milliGRAM(s) Oral every 6 hours PRN Mild Pain (1 - 3), Moderate Pain (4 - 6)  aluminum hydroxide/magnesium hydroxide/simethicone Suspension 30 milliLiter(s) Oral every 4 hours PRN Dyspepsia  morphine  - Injectable 2 milliGRAM(s) IV Push every 6 hours PRN Severe Pain (7 - 10)      CAPILLARY BLOOD GLUCOSE        I&O's Summary      PHYSICAL EXAM:  Vital Signs Last 24 Hrs  T(C): 36.6 (08 Jan 2021 05:51), Max: 37.4 (07 Jan 2021 11:47)  T(F): 97.9 (08 Jan 2021 05:51), Max: 99.4 (07 Jan 2021 11:47)  HR: 92 (08 Jan 2021 05:51) (82 - 99)  BP: 130/78 (08 Jan 2021 05:51) (101/60 - 130/78)  BP(mean): --  RR: 18 (08 Jan 2021 05:51) (16 - 25)  SpO2: 93% (08 Jan 2021 05:51) (93% - 96%)    GENERAL: No acute distress, well-developed  HEAD:  Atraumatic, Normocephalic  EYES: EOMI, PERRLA, conjunctiva and sclera clear  NECK: Supple, no lymphadenopathy, no JVD  CHEST/LUNG: CTAB; No wheezes, rales, or rhonchi  HEART: Regular rate and rhythm; Normal s1 and s2, No murmurs, rubs, or gallops  ABDOMEN: Soft, non-tender, non-distended; normal bowel sounds, no organomegaly  EXTREMITIES:  2+ peripheral pulses b/l, No clubbing, cyanosis, or edema  NEUROLOGY: A&O x 3, no focal deficits  SKIN: No rashes or lesions          LABS:                        14.6   12.74 )-----------( 208      ( 08 Jan 2021 07:04 )             45.3     01-06    136  |  99  |  16  ----------------------------<  128<H>  4.1   |  24  |  0.97    Ca    9.9      06 Jan 2021 21:56    TPro  7.1  /  Alb  4.7  /  TBili  0.7  /  DBili  x   /  AST  13  /  ALT  18  /  AlkPhos  42  01-06                RADIOLOGY & ADDITIONAL TESTS:  Results Reviewed:   Imaging Personally Reviewed:  Electrocardiogram Personally Reviewed:    COORDINATION OF CARE:  Care Discussed with Consultants/Other Providers [Y/N]:  Prior or Outpatient Records Reviewed [Y/N]:

## 2021-01-08 NOTE — PROGRESS NOTE ADULT - PROBLEM SELECTOR PLAN 1
The patient presenting w/ acute epigastric pain most likely 2/2 to peptic ulcer or gastritis. Mild leukocytosis and CT findings of cholelithiasis and findings consistent with gastritis reactive thickening and possible ulcer as well as gallbladder wall thickening. Cholithiasis w/o stone obstruction and cholcystitis unlikely given negative US and CT. Risk factors with caffeine and smoking.   - Planned for Endoscopy today, will follow up results for r/o malignancy   - GI consulted    - Start pantoprazole 40mg BID and Maalox   - Will maintain NPO until scope   - elevated lipase and will repeat to reassess   - Pain control with po tylenol for mild to moderate and morphine for severe pain  - EKG sinus rhythm w/o st changes and troponins negative The patient presenting w/ acute epigastric pain most likely 2/2 to peptic ulcer or gastritis. Possible H.pylori. Mild leukocytosis and CT findings of cholelithiasis and findings consistent with gastritis reactive thickening and possible ulcer as well as gallbladder wall thickening. Cholithiasis w/o stone obstruction and cholcystitis unlikely given negative US and CT. Risk factors with caffeine and smoking.   - Planned for Endoscopy today, will follow up results for r/o malignancy   - GI consulted    - Start pantoprazole 40mg BID and Maalox   - Will maintain NPO until scope   - elevated lipase and will repeat to reassess   - Pain control with po tylenol for mild to moderate and morphine for severe pain  - EKG sinus rhythm w/o st changes and troponins negative The patient presenting w/ acute epigastric pain most likely 2/2 to peptic ulcer or gastritis. Possible H.pylori. Mild leukocytosis and CT findings of cholelithiasis and findings consistent with gastritis reactive thickening and possible ulcer as well as gallbladder wall thickening. Cholithiasis w/o stone obstruction and cholcystitis unlikely given negative US and CT. Risk factors with caffeine and smoking.   - s/p endoscopy showing duodenitis and gastric and duodenal ulcers    - c/w pantoprazole 40mg BID and Maalox   - c/w sucralfate 1 gram q6 hours   - Will maintain NPO until scope   - elevated lipase and will repeat to reassess   - Pain control with po tylenol for mild to moderate and morphine for severe pain  - EKG sinus rhythm w/o st changes and troponins negative

## 2021-01-08 NOTE — PROGRESS NOTE ADULT - ASSESSMENT
55 year old man without PMH because he does not see a doctor who presents with acute onset epigastric pain, found  to have gastritis on CT scan with reactive gall bladder wall thickening and elevation in lipase. An EGD was completed which demonstrated esophagitis, erythematous mucosa in the antrum, one non-bleeding duodenal ulcer and diffuse ulcerated duodenitis.     Plan:  - CT demonstrates marked gastritis, likely mild elevation in lipase is reactive, in addition to gall bladder wall thickening as mentioned by radiology  - pancreas wnl on CT scan  - patient gastritis likely secondary to daily smoking and coffee usage (5-6 cups/day)  - appreciate gastroenterology, EGD findings above   - cholelithiasis noted, likely not causing gall stone pancreatitis given normal LFTs, normal pancreas, and no common bile duct dilation; could follow up for elective surgery if any RUQ symptoms develop after gastritis episode    Red Team Surgery  [phone]   55 year old man without PMH because he does not see a doctor who presents with acute onset epigastric pain, found  to have gastritis on CT scan with reactive gall bladder wall thickening and elevation in lipase. An EGD was completed which demonstrated esophagitis, erythematous mucosa in the antrum, one non-bleeding duodenal ulcer and diffuse ulcerated duodenitis.     Plan:  - CT demonstrates marked gastritis, likely mild elevation in lipase is reactive, in addition to gall bladder wall thickening as mentioned by radiology  - pancreas wnl on CT scan  - patient gastritis likely secondary to daily smoking and coffee usage (5-6 cups/day)  - appreciate gastroenterology, EGD findings above   - cholelithiasis noted, likely not causing gall stone pancreatitis given normal LFTs, normal pancreas, and no common bile duct dilation; could follow up for elective surgery if any RUQ symptoms develop after gastritis episode  - please re-consult with questions   - continue care per medicine team     Red Team Surgery  [phone]

## 2021-01-08 NOTE — PROGRESS NOTE ADULT - ASSESSMENT
55M w/o sig PMH p/w abd pain. Mildly elevated lipase but normal pancreas on imaging. Gastric antral thickening on CT. EGD shows large duodenal ulcer.    Impression:  #Abd pain from duodenal ulcer - s/p gastric biopsies for H Pylori and biopsies of ulcer.     Recommendations:  - PO PPI daily  - F/u pathology  - Diet as tolerated  - Outpatient follow up in Gastroenterology Clinic; please call [phone] ([facility] at [address] [place] [address]) or [phone] ([facility] at [address] [place] [address])    Victor Hugo Doshi  Gastroenterology Fellow  NON-URGENT CONSULTS:  Please email [email or web address]  AT NIGHT AND ON WEEKENDS:  Contact on-call GI fellow via answering service ([phone]) from 5pm-8am and on weekends/holidays  MONDAY-FRIDAY 8AM-5PM:  Available via [employer] Teams  Pager# [phone] ([facility]) or [phone] ([facility])  GI Phone# [phone] ([facility])

## 2021-01-08 NOTE — PROGRESS NOTE ADULT - PROBLEM SELECTOR PLAN 4
DVT: SCD  Diet: NPO for now, pending GI Eval  Bowel Regimen: Pantoprazole, Maalox   Code: Full code   Dispo: home after GI eval and pain control DVT: SCD  Diet: Regular diet, tolerated clears w/o issue   Bowel Regimen: Pantoprazole, Maalox   Code: Full code   Dispo: home after GI eval and pain control

## 2021-01-09 VITALS
SYSTOLIC BLOOD PRESSURE: 107 MMHG | HEART RATE: 75 BPM | OXYGEN SATURATION: 93 % | DIASTOLIC BLOOD PRESSURE: 67 MMHG | TEMPERATURE: 97 F | RESPIRATION RATE: 18 BRPM

## 2021-01-09 LAB
ALBUMIN SERPL ELPH-MCNC: 3.2 G/DL — LOW (ref 3.3–5)
ALP SERPL-CCNC: 67 U/L — SIGNIFICANT CHANGE UP (ref 40–120)
ALT FLD-CCNC: 45 U/L — SIGNIFICANT CHANGE UP (ref 10–45)
ANION GAP SERPL CALC-SCNC: 10 MMOL/L — SIGNIFICANT CHANGE UP (ref 5–17)
AST SERPL-CCNC: 42 U/L — HIGH (ref 10–40)
BILIRUB SERPL-MCNC: 0.8 MG/DL — SIGNIFICANT CHANGE UP (ref 0.2–1.2)
BUN SERPL-MCNC: 13 MG/DL — SIGNIFICANT CHANGE UP (ref 7–23)
CALCIUM SERPL-MCNC: 9.4 MG/DL — SIGNIFICANT CHANGE UP (ref 8.4–10.5)
CHLORIDE SERPL-SCNC: 100 MMOL/L — SIGNIFICANT CHANGE UP (ref 96–108)
CO2 SERPL-SCNC: 26 MMOL/L — SIGNIFICANT CHANGE UP (ref 22–31)
CREAT SERPL-MCNC: 0.89 MG/DL — SIGNIFICANT CHANGE UP (ref 0.5–1.3)
GLUCOSE SERPL-MCNC: 101 MG/DL — HIGH (ref 70–99)
HCT VFR BLD CALC: 42.2 % — SIGNIFICANT CHANGE UP (ref 39–50)
HGB BLD-MCNC: 13.8 G/DL — SIGNIFICANT CHANGE UP (ref 13–17)
MAGNESIUM SERPL-MCNC: 2.2 MG/DL — SIGNIFICANT CHANGE UP (ref 1.6–2.6)
MCHC RBC-ENTMCNC: 28.1 PG — SIGNIFICANT CHANGE UP (ref 27–34)
MCHC RBC-ENTMCNC: 32.7 GM/DL — SIGNIFICANT CHANGE UP (ref 32–36)
MCV RBC AUTO: 85.9 FL — SIGNIFICANT CHANGE UP (ref 80–100)
NRBC # BLD: 0 /100 WBCS — SIGNIFICANT CHANGE UP (ref 0–0)
PHOSPHATE SERPL-MCNC: 2 MG/DL — LOW (ref 2.5–4.5)
PLATELET # BLD AUTO: 185 K/UL — SIGNIFICANT CHANGE UP (ref 150–400)
POTASSIUM SERPL-MCNC: 4.8 MMOL/L — SIGNIFICANT CHANGE UP (ref 3.5–5.3)
POTASSIUM SERPL-SCNC: 4.8 MMOL/L — SIGNIFICANT CHANGE UP (ref 3.5–5.3)
PROT SERPL-MCNC: 6.3 G/DL — SIGNIFICANT CHANGE UP (ref 6–8.3)
RBC # BLD: 4.91 M/UL — SIGNIFICANT CHANGE UP (ref 4.2–5.8)
RBC # FLD: 12.9 % — SIGNIFICANT CHANGE UP (ref 10.3–14.5)
SODIUM SERPL-SCNC: 136 MMOL/L — SIGNIFICANT CHANGE UP (ref 135–145)
WBC # BLD: 10.18 K/UL — SIGNIFICANT CHANGE UP (ref 3.8–10.5)
WBC # FLD AUTO: 10.18 K/UL — SIGNIFICANT CHANGE UP (ref 3.8–10.5)

## 2021-01-09 PROCEDURE — U0005: CPT

## 2021-01-09 PROCEDURE — 85027 COMPLETE CBC AUTOMATED: CPT

## 2021-01-09 PROCEDURE — 81001 URINALYSIS AUTO W/SCOPE: CPT

## 2021-01-09 PROCEDURE — 88312 SPECIAL STAINS GROUP 1: CPT

## 2021-01-09 PROCEDURE — 84100 ASSAY OF PHOSPHORUS: CPT

## 2021-01-09 PROCEDURE — 85025 COMPLETE CBC W/AUTO DIFF WBC: CPT

## 2021-01-09 PROCEDURE — 83036 HEMOGLOBIN GLYCOSYLATED A1C: CPT

## 2021-01-09 PROCEDURE — 87635 SARS-COV-2 COVID-19 AMP PRB: CPT

## 2021-01-09 PROCEDURE — 74174 CTA ABD&PLVS W/CONTRAST: CPT

## 2021-01-09 PROCEDURE — 99285 EMERGENCY DEPT VISIT HI MDM: CPT | Mod: 25

## 2021-01-09 PROCEDURE — 99233 SBSQ HOSP IP/OBS HIGH 50: CPT | Mod: GC

## 2021-01-09 PROCEDURE — 96376 TX/PRO/DX INJ SAME DRUG ADON: CPT | Mod: XU

## 2021-01-09 PROCEDURE — 80053 COMPREHEN METABOLIC PANEL: CPT

## 2021-01-09 PROCEDURE — 80061 LIPID PANEL: CPT

## 2021-01-09 PROCEDURE — 76700 US EXAM ABDOM COMPLETE: CPT

## 2021-01-09 PROCEDURE — 88305 TISSUE EXAM BY PATHOLOGIST: CPT

## 2021-01-09 PROCEDURE — 83735 ASSAY OF MAGNESIUM: CPT

## 2021-01-09 PROCEDURE — 86769 SARS-COV-2 COVID-19 ANTIBODY: CPT

## 2021-01-09 PROCEDURE — 93005 ELECTROCARDIOGRAM TRACING: CPT

## 2021-01-09 PROCEDURE — 71275 CT ANGIOGRAPHY CHEST: CPT

## 2021-01-09 PROCEDURE — 96374 THER/PROPH/DIAG INJ IV PUSH: CPT | Mod: XU

## 2021-01-09 RX ORDER — SUCRALFATE 1 G
1 TABLET ORAL
Qty: 120 | Refills: 4
Start: 2021-01-09 | End: 2021-06-07

## 2021-01-09 RX ORDER — PANTOPRAZOLE SODIUM 20 MG/1
1 TABLET, DELAYED RELEASE ORAL
Qty: 60 | Refills: 2
Start: 2021-01-09 | End: 2021-04-08

## 2021-01-09 RX ORDER — SODIUM,POTASSIUM PHOSPHATES 278-250MG
1 POWDER IN PACKET (EA) ORAL ONCE
Refills: 0 | Status: DISCONTINUED | OUTPATIENT
Start: 2021-01-09 | End: 2021-01-09

## 2021-01-09 RX ADMIN — Medication 650 MILLIGRAM(S): at 05:16

## 2021-01-09 RX ADMIN — Medication 62.5 MILLIMOLE(S): at 09:47

## 2021-01-09 RX ADMIN — Medication 30 MILLILITER(S): at 05:17

## 2021-01-09 RX ADMIN — Medication 650 MILLIGRAM(S): at 16:54

## 2021-01-09 RX ADMIN — Medication 1 GRAM(S): at 05:16

## 2021-01-09 RX ADMIN — Medication 30 MILLILITER(S): at 16:54

## 2021-01-09 RX ADMIN — PANTOPRAZOLE SODIUM 40 MILLIGRAM(S): 20 TABLET, DELAYED RELEASE ORAL at 16:54

## 2021-01-09 RX ADMIN — PANTOPRAZOLE SODIUM 40 MILLIGRAM(S): 20 TABLET, DELAYED RELEASE ORAL at 05:17

## 2021-01-09 RX ADMIN — Medication 650 MILLIGRAM(S): at 11:27

## 2021-01-09 RX ADMIN — Medication 1 GRAM(S): at 11:26

## 2021-01-09 RX ADMIN — Medication 1 GRAM(S): at 16:55

## 2021-01-09 RX ADMIN — Medication 30 MILLILITER(S): at 09:47

## 2021-01-09 NOTE — PROGRESS NOTE ADULT - PROBLEM SELECTOR PLAN 4
DVT: SCD  Diet: Regular diet, tolerated clears w/o issue   Bowel Regimen: Pantoprazole, Maalox   Code: Full code   Dispo: home after GI eval and pain control DVT: SCD, tolerated clears w/o issue   Bowel Regimen: Pantoprazole, Maalox   Code: Full code   Dispo: Likely home today

## 2021-01-09 NOTE — DISCHARGE NOTE PROVIDER - CARE PROVIDERS DIRECT ADDRESSES
[email or web address]

## 2021-01-09 NOTE — PROGRESS NOTE ADULT - SUBJECTIVE AND OBJECTIVE BOX
PROGRESS NOTE:     CONTACT INFO:  Eloy Garner  PGY3  [phone]    Patient is a 55y old  Male who presents with a chief complaint of abdominal pain (2021 11:12)      SUBJECTIVE / OVERNIGHT EVENTS:    ADDITIONAL REVIEW OF SYSTEMS:    MEDICATIONS  (STANDING):  acetaminophen   Tablet .. 650 milliGRAM(s) Oral every 6 hours  aluminum hydroxide/magnesium hydroxide/simethicone Suspension 30 milliLiter(s) Oral every 4 hours  pantoprazole    Tablet 40 milliGRAM(s) Oral two times a day  polyethylene glycol 3350 17 Gram(s) Oral daily  senna 2 Tablet(s) Oral at bedtime  sodium chloride 0.9%. 1000 milliLiter(s) (30 mL/Hr) IV Continuous <Continuous>  sucralfate 1 Gram(s) Oral four times a day    MEDICATIONS  (PRN):  oxyCODONE    IR 5 milliGRAM(s) Oral every 6 hours PRN Severe Pain (7 - 10)      CAPILLARY BLOOD GLUCOSE        I&O's Summary    2021 07:01  -  2021 07:00  --------------------------------------------------------  IN: 840 mL / OUT: 0 mL / NET: 840 mL    2021 07:01  -  2021 06:45  --------------------------------------------------------  IN: 0 mL / OUT: 600 mL / NET: -600 mL        PHYSICAL EXAM:  Vital Signs Last 24 Hrs  T(C): 36.6 (2021 05:28), Max: 36.8 (2021 12:00)  T(F): 97.8 (2021 05:28), Max: 98.2 (2021 12:00)  HR: 83 (2021 05:28) (82 - 89)  BP: 113/70 (2021 05:28) (104/63 - 113/70)  BP(mean): --  RR: 18 (2021 05:28) (18 - 18)  SpO2: 93% (2021 05:28) (93% - 94%)    GENERAL: No acute distress, well-developed  HEAD:  Atraumatic, Normocephalic  EYES: EOMI, PERRLA, conjunctiva and sclera clear  NECK: Supple, no lymphadenopathy, no JVD  CHEST/LUNG: CTAB; No wheezes, rales, or rhonchi  HEART: Regular rate and rhythm; Normal s1 and s2, No murmurs, rubs, or gallops  ABDOMEN: Soft, non-tender, non-distended; normal bowel sounds, no organomegaly  EXTREMITIES:  2+ peripheral pulses b/l, No clubbing, cyanosis, or edema  NEUROLOGY: A&O x 3, no focal deficits  SKIN: No rashes or lesions    LABS:                        14.6   12.74 )-----------( 208      ( 2021 07:04 )             45.3     01-08    132<L>  |  98  |  15  ----------------------------<  98  4.0   |  24  |  0.96    Ca    9.4      2021 07:04  Phos  1.7     01-08  Mg     2.2     -08    TPro  6.5  /  Alb  3.7  /  TBili  0.9  /  DBili  x   /  AST  11  /  ALT  14  /  AlkPhos  37<L>  -08          Urinalysis Basic - ( 2021 08:48 )    Color: Light Orange / Appearance: Clear / S.030 / pH: x  Gluc: x / Ketone: Small  / Bili: Negative / Urobili: Negative   Blood: x / Protein: Trace / Nitrite: Negative   Leuk Esterase: Negative / RBC: 3 /hpf / WBC 2 /HPF   Sq Epi: x / Non Sq Epi: 0 /hpf / Bacteria: Negative          RADIOLOGY & ADDITIONAL TESTS:  Results Reviewed:   Imaging Personally Reviewed:  Electrocardiogram Personally Reviewed:    COORDINATION OF CARE:  Care Discussed with Consultants/Other Providers [Y/N]:  Prior or Outpatient Records Reviewed [Y/N]:   PROGRESS NOTE:     CONTACT INFO:  Eloy Garner  PGY3  [phone]    Patient is a 55y old  Male who presents with a chief complaint of abdominal pain (2021 11:12)      SUBJECTIVE / OVERNIGHT EVENTS: No event overnight. Still with epigastric pain, but improved from yesterday. Able to tolerate diet. Normal BM.    ADDITIONAL REVIEW OF SYSTEMS:    MEDICATIONS  (STANDING):  acetaminophen   Tablet .. 650 milliGRAM(s) Oral every 6 hours  aluminum hydroxide/magnesium hydroxide/simethicone Suspension 30 milliLiter(s) Oral every 4 hours  pantoprazole    Tablet 40 milliGRAM(s) Oral two times a day  polyethylene glycol 3350 17 Gram(s) Oral daily  senna 2 Tablet(s) Oral at bedtime  sodium chloride 0.9%. 1000 milliLiter(s) (30 mL/Hr) IV Continuous <Continuous>  sucralfate 1 Gram(s) Oral four times a day    MEDICATIONS  (PRN):  oxyCODONE    IR 5 milliGRAM(s) Oral every 6 hours PRN Severe Pain (7 - 10)      CAPILLARY BLOOD GLUCOSE        I&O's Summary    2021 07:01  -  2021 07:00  --------------------------------------------------------  IN: 840 mL / OUT: 0 mL / NET: 840 mL    2021 07:01  -  2021 06:45  --------------------------------------------------------  IN: 0 mL / OUT: 600 mL / NET: -600 mL        PHYSICAL EXAM:  Vital Signs Last 24 Hrs  T(C): 36.6 (2021 05:28), Max: 36.8 (2021 12:00)  T(F): 97.8 (2021 05:28), Max: 98.2 (2021 12:00)  HR: 83 (2021 05:28) (82 - 89)  BP: 113/70 (2021 05:28) (104/63 - 113/70)  BP(mean): --  RR: 18 (2021 05:28) (18 - 18)  SpO2: 93% (2021 05:28) (93% - 94%)    GENERAL: No acute distress, well-developed  HEAD:  Atraumatic, Normocephalic  EYES: EOMI, PERRLA, conjunctiva and sclera clear  NECK: Supple, no lymphadenopathy, no JVD  CHEST/LUNG: CTAB; No wheezes, rales, or rhonchi  HEART: Regular rate and rhythm; Normal s1 and s2, No murmurs, rubs, or gallops  ABDOMEN: Soft, TTP in the epigastric region, non-distended; normal bowel sounds, no organomegaly  EXTREMITIES:  2+ peripheral pulses b/l, No clubbing, cyanosis, or edema  NEUROLOGY: A&O x 3, no focal deficits  SKIN: No rashes or lesions    LABS:                        14.6   12.74 )-----------( 208      ( 2021 07:04 )             45.3     01-08    132<L>  |  98  |  15  ----------------------------<  98  4.0   |  24  |  0.96    Ca    9.4      2021 07:04  Phos  1.7     01-08  Mg     2.2     01-08    TPro  6.5  /  Alb  3.7  /  TBili  0.9  /  DBili  x   /  AST  11  /  ALT  14  /  AlkPhos  37<L>  01-08          Urinalysis Basic - ( 2021 08:48 )    Color: Light Orange / Appearance: Clear / S.030 / pH: x  Gluc: x / Ketone: Small  / Bili: Negative / Urobili: Negative   Blood: x / Protein: Trace / Nitrite: Negative   Leuk Esterase: Negative / RBC: 3 /hpf / WBC 2 /HPF   Sq Epi: x / Non Sq Epi: 0 /hpf / Bacteria: Negative          RADIOLOGY & ADDITIONAL TESTS:  Results Reviewed:   Imaging Personally Reviewed:  Electrocardiogram Personally Reviewed:    COORDINATION OF CARE:  Care Discussed with Consultants/Other Providers [Y/N]:  Prior or Outpatient Records Reviewed [Y/N]:

## 2021-01-09 NOTE — DISCHARGE NOTE PROVIDER - NSDCCPTREATMENT_GEN_ALL_CORE_FT
PRINCIPAL PROCEDURE  Procedure: UGI endoscopy  Findings and Treatment: Findings:       LA Grade A (one or more mucosal breaks less than 5 mm, not extending between tops of 2        mucosal folds) esophagitis with no bleeding was found in the lower third of the esophagus.       Patchy mildly erythematous mucosa without bleeding was found in the gastric antrum.       The exam of the stomach was otherwise normal.       The exam of the esophagus was otherwise normal.       Biopsies were taken with a cold forceps in the gastric body, at the incisura and in the        gastric antrum for Helicobacter pylori testing.       Just distal to the pylorus at 10 oclock in the proximal bulb was a single, non-bleeding        cratered, deep appearing duodenal ulcer with no stigmata of bleeding was found in the        duodenal bulb. The lesion was 3 mm in largest dimension. Biopsies were taken with a cold        forceps for histology.       Diffuse moderate mucosal changes characterized by erythema, erosion and ulceration were found        in the duodenal bulb.       The exam of the duodenum was otherwise normal.                                                                                                        Impression:          - LA Grade A esophagitis.                       - Erythematous mucosa in the antrum. Bx taken from [facility]                       - One non-bleeding duodenal ulcer with no stigmata of bleeding. Biopsied.                       - Diffuse ulcerated duodenitis elsewhere in the bulb.  Recommendation:      - Await pathology results.                       - High dose PPI twice daily +/- Carafate 4x/day                       - Diet as tolerated, Return to floor

## 2021-01-09 NOTE — DISCHARGE NOTE PROVIDER - NSDCCPCAREPLAN_GEN_ALL_CORE_FT
PRINCIPAL DISCHARGE DIAGNOSIS  Diagnosis: Abdominal pain  Assessment and Plan of Treatment: You had abdominal pain, we involved gastroenterology doctor and they performed an EGD which showed gastritis, and esophagitis. We treated you with medications and your symptoms improved. Take all of the perscribed medications as instructed. See a gastroenterology doctor within 1-2 weeks of discharge for further care as well as followup on the pathology of the EGD biopsy.   HOME CARE INSTRUCTIONS  Change the factors that you can control. Ask your caregiver for guidance concerning weight loss, quitting smoking, and alcohol consumption.  Avoid foods and drinks that make your symptoms worse, such as:   Caffeine or alcoholic drinks. Chocolate. Peppermint or mint flavorings. Garlic and onions. Spicy foods.   Citrus fruits, such as oranges, alfonso, or limes. Tomato-based foods such as sauce, chili, salsa, and pizza.  Fried and fatty foods.  Avoid lying down for the 3 hours prior to your bedtime or prior to taking a nap.  Eat small, frequent meals instead of large meals.   Wear loose-fitting clothing. Do not wear anything tight around your waist that causes pressure on your stomach.  Raise the head of your bed 6 to 8 inches with wood blocks to help you sleep. Extra pillows will not help.  Only take over-the-counter or prescription medicines for pain, discomfort, or fever as directed by your caregiver.   Do not take aspirin, ibuprofen, or other nonsteroidal anti-inflammatory drugs (NSAIDs).  SEEK IMMEDIATE MEDICAL CARE IF:  You have pain in your arms, neck, jaw, teeth, or back.   Your pain increases or changes in intensity or duration.   You develop nausea, vomiting, or sweating (diaphoresis).  You develop shortness of breath, or you faint.  Your vomit is green, yellow, black, or looks like coffee grounds or blood.  Your stool is red, bloody, or black.  These symptoms could be signs of other problems, such as heart disease, gastric bleeding, or esophageal bleeding.

## 2021-01-09 NOTE — DISCHARGE NOTE PROVIDER - NSFOLLOWUPCLINICS_GEN_ALL_ED_FT
[facility]  Gastroenterology  [address] [place] [address]  Phone: [phone]  Fax:   Follow Up Time: 1 week

## 2021-01-09 NOTE — PROGRESS NOTE ADULT - ATTENDING COMMENTS
pt seen and examined  agree with above  s/p EGD  cont supportive care per med/gi  no acute sx issues  will sign off  reconsult prn
severe abdominal pain 2' esophagitis/ gastritis and duodenitis in [place] setting of cigarette and coffee use- lifestyle modification. c/w ppi bid with carafate. advance diet as tolerate. dc 24 hours if tolerating diet.    Shelly English D.O.  Hospitalist Pager # [phone]
dc today, cleared by GI for dc  1 hour spent in preparation of discharge  c/w protonix, carafate; outpatient GI f/u    Shelly English D.O.  Hospitalist Pager # [phone]

## 2021-01-09 NOTE — PROGRESS NOTE ADULT - PROBLEM SELECTOR PLAN 3
The patient has evidence of gastritis or peptic ulcer disease based on CT findings. Now found to have mildly elevated lipase likely in the setting of GI inflammation. Will reassess tomorrow if no resolution of symptoms.
The patient has evidence of gastritis or peptic ulcer disease based on CT findings. Now found to have mildly elevated lipase likely in the setting of GI inflammation. Will reassess tomorrow if no resolution of symptoms.

## 2021-01-09 NOTE — DISCHARGE NOTE PROVIDER - CARE PROVIDER_API CALL
Nickie Rodriguez  INTERNAL MEDICINE  [address] [facility], [address]  [place] [address]  Phone: [phone]  Fax: [phone]  Follow Up Time: 2 weeks

## 2021-01-09 NOTE — PROGRESS NOTE ADULT - PROBLEM SELECTOR PLAN 2
The patient is presenting with acute epigastric pain in the setting of CT findings with gastric thickening and possible ulcer as well as gall bladder thickening. US findings show cholelithiasis without stone obstruction or infection. Most likely reactive in the setting of gastritis or peptic ulcer disease.  - No evidence of acute infection will monitor off antibiotics   - if fever or acute decompensation will start abx with ciprofloxacin and flagyl
The patient is presenting with acute epigastric pain in the setting of CT findings with gastric thickening and possible ulcer as well as gall bladder thickening. US findings show cholelithiasis without stone obstruction or infection. Most likely reactive in the setting of gastritis or peptic ulcer disease.  - No evidence of acute infection will monitor off antibiotics   - if fever or acute decompensation will start abx with ciprofloxacin and flagyl

## 2021-01-09 NOTE — PROGRESS NOTE ADULT - ASSESSMENT
55 year old man without PMH (do not see outpatient doctor) presents with acute onset epigastric pain, found to have duodenitis and gastric ulcers possible 2/2 to peptic ulcer disease vs. H. pylori infection. Multiple risk factors including caffeine, smoking, and H pylori exposure.

## 2021-01-09 NOTE — PROGRESS NOTE ADULT - PROBLEM SELECTOR PLAN 1
The patient presenting w/ acute epigastric pain most likely 2/2 to peptic ulcer or gastritis. Possible H.pylori. Mild leukocytosis and CT findings of cholelithiasis and findings consistent with gastritis reactive thickening and possible ulcer as well as gallbladder wall thickening. Cholithiasis w/o stone obstruction and cholcystitis unlikely given negative US and CT. Risk factors with caffeine and smoking.   - s/p endoscopy showing duodenitis and gastric and duodenal ulcers    - c/w pantoprazole 40mg BID and Maalox   - c/w sucralfate 1 gram q6 hours   - Will maintain NPO until scope   - elevated lipase and will repeat to reassess   - Pain control with po tylenol for mild to moderate and morphine for severe pain  - EKG sinus rhythm w/o st changes and troponins negative The patient presenting w/ acute epigastric pain most likely 2/2 to peptic ulcer or gastritis. Possible H.pylori. Mild leukocytosis and CT findings of cholelithiasis and findings consistent with gastritis reactive thickening and possible ulcer as well as gallbladder wall thickening. Cholithiasis w/o stone obstruction and cholcystitis unlikely given negative US and CT. Risk factors with caffeine and smoking.   - s/p endoscopy showing duodenitis and gastric and duodenal ulcers    - c/w pantoprazole 40mg BID and Maalox and Tyelnol ATC  - c/w sucralfate 1 gram q6 hours   - Tolerating regular diet  - Pain control with po tylenol for mild to moderate and PO oxy for severe pain ( hasn't used Oxy since yesterday PM)  - EKG sinus rhythm w/o st changes and troponins negative

## 2021-01-09 NOTE — DISCHARGE NOTE PROVIDER - HOSPITAL COURSE
HPI:  55 year old man without PMH (last doctor visit 3 years ago) presents with acute onset epigastric pain. He reports that for the past 2-3 days he has had decreased appetite and weakness without associated nausea or vomiting or early satiety until this morning he develop severe acute onset 10/10 sharp abdominal pain. The pain is located in the center of his abdomen and is worse with movement and it radiates to the b/l flanks when he stands up. Additionally, he reports for the past 4- 5 days he has had felt weak prior to developing this acute pain In the evening yesterday. The patient reports that he has never experienced this before nor does he have pain after eating. Although he reports that he has had pain for several months after consuming coffee and tea intermittently. The patient denies any NSAID use or over the counter medication. He reports that he drinks 7-8 cups of coffee every single day. He denies any chills, weight loss, night sweats, fever, chest pain, sob, nausea, vomiting or diarrhea.     Denies prior h/o EGD nor colonoscopy. No personal nor family of malignancy. Denies CP, SOB, fever, chill, n/v/c/d, nor urinary concerns.    ED:  Vitals stable  WBC 14, and lipase 168.  s/p 1L LR, Morphine 4mg x 3, and Sucralfate.  CT demonstrated gastritis with reactive gall bladder wall thickening and a normal pancreas/gall bladder. RUQ US showed one gall stone, 1cm. Negative Cameron. s/p surgery consult, no acute intervention needed, admitted to medicine for further management.     Hospital Course:  GI was consulted, and patient underwent EGD which showed esophagitis, duodenitis, and biopsy taken for H. Pylori. Patient's symptoms treated with Tylenol, maalox, Carafate and Protonix which improvement. He tolerated diet well.   He is stable for discharge with outpatient GI followup.

## 2021-01-09 NOTE — DISCHARGE NOTE PROVIDER - NSDCMRMEDTOKEN_GEN_ALL_CORE_FT
aluminum hydroxide-magnesium hydroxide 200 mg-200 mg/5 mL oral suspension: 30 milliliter(s) orally every 4 hours, As Needed for abdominal pain  Protonix 40 mg oral delayed release tablet: 1 tab(s) orally 2 times a day   sucralfate 1 g oral tablet: 1 tab(s) orally 4 times a day (before meals and at bedtime)

## 2021-01-09 NOTE — DISCHARGE NOTE PROVIDER - NSDCFUADDAPPT_GEN_ALL_CORE_FT
Outpatient follow up in Gastroenterology Clinic; please call [phone] ([facility] at [facility]) or [phone] ([facility]) within 1-2 weeks of discharge.

## 2021-01-19 NOTE — CHART NOTE - NSCHARTNOTEFT_GEN_A_CORE
Bx positive for H pylori.    Spoke w/ patient's son - pt is aware and receiving treatment from his GI doctor as an outpatient.

## 2021-08-26 ENCOUNTER — TRANSCRIPTION ENCOUNTER (OUTPATIENT)
Age: 56
End: 2021-08-26

## 2023-07-12 ENCOUNTER — EMERGENCY (EMERGENCY)
Facility: HOSPITAL | Age: 58
LOS: 1 days | Discharge: LEFT BEFORE DISCHARGE | End: 2023-07-12
Payer: SELF-PAY

## 2023-07-12 PROCEDURE — L9992: CPT

## 2023-07-13 PROBLEM — Z78.9 OTHER SPECIFIED HEALTH STATUS: Chronic | Status: ACTIVE | Noted: 2021-01-06

## 2023-10-24 NOTE — DISCHARGE NOTE NURSING/CASE MANAGEMENT/SOCIAL WORK - NSTOBACCONEVERSMOKERY/N_GEN_A
Yes Detail Level: Zone Photo Preface (Leave Blank If You Do Not Want): Photographs were obtained today

## 2025-06-04 NOTE — DISCHARGE NOTE PROVIDER - NSDCQMAMI_CARD_ALL_CORE
".    [facility] VIRGINIA Baxter                                                 Individual Treatment Plan    Patient's Name: Nakul Flores  YOB: 1985    Date of Creation: 10/10/2023    Date Treatment Plan Last Reviewed/Revised: 10/10/2023; 1/16/2024; 4/16/24; 7/12/24, 10/16/24, 1/15/2025, 4/23/Adriane Villarreal, LMFT  October 10, 2023